# Patient Record
Sex: FEMALE | Race: WHITE | Employment: PART TIME | URBAN - METROPOLITAN AREA
[De-identification: names, ages, dates, MRNs, and addresses within clinical notes are randomized per-mention and may not be internally consistent; named-entity substitution may affect disease eponyms.]

---

## 2021-06-11 ENCOUNTER — TELEPHONE (OUTPATIENT)
Dept: PRIMARY CARE CLINIC | Age: 61
End: 2021-06-11

## 2021-06-11 NOTE — TELEPHONE ENCOUNTER
Patient called in requesting to establish, previous physician was Chandni Damico from [place]. Please advise.

## 2021-06-23 ENCOUNTER — OFFICE VISIT (OUTPATIENT)
Dept: PRIMARY CARE CLINIC | Age: 61
End: 2021-06-23
Payer: COMMERCIAL

## 2021-06-23 VITALS
HEIGHT: 65 IN | OXYGEN SATURATION: 98 % | BODY MASS INDEX: 35.99 KG/M2 | HEART RATE: 89 BPM | WEIGHT: 216 LBS | TEMPERATURE: 97.1 F | DIASTOLIC BLOOD PRESSURE: 86 MMHG | SYSTOLIC BLOOD PRESSURE: 144 MMHG

## 2021-06-23 DIAGNOSIS — E11.9 TYPE 2 DIABETES MELLITUS WITHOUT COMPLICATION, WITHOUT LONG-TERM CURRENT USE OF INSULIN (HCC): ICD-10-CM

## 2021-06-23 DIAGNOSIS — I10 ESSENTIAL (PRIMARY) HYPERTENSION: ICD-10-CM

## 2021-06-23 DIAGNOSIS — E03.9 HYPOTHYROIDISM, UNSPECIFIED TYPE: ICD-10-CM

## 2021-06-23 DIAGNOSIS — M25.512 LEFT SHOULDER PAIN, UNSPECIFIED CHRONICITY: Primary | ICD-10-CM

## 2021-06-23 DIAGNOSIS — N20.0 CALCULUS OF KIDNEY: ICD-10-CM

## 2021-06-23 DIAGNOSIS — G25.0 ESSENTIAL TREMOR: ICD-10-CM

## 2021-06-23 DIAGNOSIS — I89.0 LYMPHEDEMA: ICD-10-CM

## 2021-06-23 DIAGNOSIS — Z12.11 SCREEN FOR COLON CANCER: ICD-10-CM

## 2021-06-23 DIAGNOSIS — Z12.31 SCREENING MAMMOGRAM, ENCOUNTER FOR: ICD-10-CM

## 2021-06-23 PROCEDURE — 99204 OFFICE O/P NEW MOD 45 MIN: CPT | Performed by: FAMILY MEDICINE

## 2021-06-23 RX ORDER — FUROSEMIDE 20 MG/1
20 TABLET ORAL AS NEEDED
COMMUNITY

## 2021-06-23 RX ORDER — CHOLECALCIFEROL (VITAMIN D3) 1250 MCG
CAPSULE ORAL
COMMUNITY
End: 2022-02-24 | Stop reason: DRUGHIGH

## 2021-06-23 RX ORDER — LEVOTHYROXINE SODIUM 25 MCG
25 TABLET ORAL DAILY
COMMUNITY
Start: 2021-05-07

## 2021-06-23 RX ORDER — DAPAGLIFLOZIN 10 MG/1
10 TABLET, FILM COATED ORAL DAILY
COMMUNITY
Start: 2021-06-12

## 2021-06-23 RX ORDER — METOPROLOL TARTRATE 50 MG/1
TABLET, FILM COATED ORAL
COMMUNITY
Start: 2021-04-29 | End: 2021-08-20 | Stop reason: SDUPTHER

## 2021-06-23 SDOH — ECONOMIC STABILITY: FOOD INSECURITY: WITHIN THE PAST 12 MONTHS, THE FOOD YOU BOUGHT JUST DIDN'T LAST AND YOU DIDN'T HAVE MONEY TO GET MORE.: NEVER TRUE

## 2021-06-23 SDOH — ECONOMIC STABILITY: FOOD INSECURITY: WITHIN THE PAST 12 MONTHS, YOU WORRIED THAT YOUR FOOD WOULD RUN OUT BEFORE YOU GOT MONEY TO BUY MORE.: NEVER TRUE

## 2021-06-23 ASSESSMENT — PATIENT HEALTH QUESTIONNAIRE - PHQ9
SUM OF ALL RESPONSES TO PHQ QUESTIONS 1-9: 0
2. FEELING DOWN, DEPRESSED OR HOPELESS: 0
SUM OF ALL RESPONSES TO PHQ QUESTIONS 1-9: 0
SUM OF ALL RESPONSES TO PHQ QUESTIONS 1-9: 0
SUM OF ALL RESPONSES TO PHQ9 QUESTIONS 1 & 2: 0
1. LITTLE INTEREST OR PLEASURE IN DOING THINGS: 0

## 2021-06-23 ASSESSMENT — ENCOUNTER SYMPTOMS
SHORTNESS OF BREATH: 0
NAUSEA: 0
WHEEZING: 0
VOMITING: 0

## 2021-06-23 ASSESSMENT — SOCIAL DETERMINANTS OF HEALTH (SDOH): HOW HARD IS IT FOR YOU TO PAY FOR THE VERY BASICS LIKE FOOD, HOUSING, MEDICAL CARE, AND HEATING?: NOT HARD AT ALL

## 2021-06-23 NOTE — PROGRESS NOTES
[facility]    Georgi Sandhu presents to the office today for   Chief Complaint   Patient presents with   Arnoldo Huston Doctor    Shoulder Pain     Type 2 diabetes  Seeing Dr. Zacarias Elizondo tomorrow  Partial parathyroidectomy in late [de-identified]    Nephrolithiasis  Seeing Dr. Skinny Gilliland in 2019  Following for now    Lymphedema  Lasix PRN with potassium occasionally    GYN - none currently  No pap in 4-5 years  No period since mid-43s    39year old daughter with Down Syndrome    Left shoulder pain  ROM limited overhead and behind back  Hand Tremor - dad has head tremor - grandfather had significant tremoring  No known Parkinsons in family  No xrays or evaluation in past    Review of Systems   Constitutional: Negative for chills and fever. Respiratory: Negative for shortness of breath and wheezing. Cardiovascular: Negative for chest pain and palpitations. Gastrointestinal: Negative for nausea and vomiting. Genitourinary: Negative for dysuria, hematuria and urgency. Skin: Negative for rash. Neurological: Negative for dizziness and light-headedness. BP (!) 144/86   Pulse 89   Temp 97.1 °F (36.2 °C) (Temporal)   Ht 5' 5\" (1.651 m)   Wt 216 lb (98 kg)   SpO2 98%   BMI 35.94 kg/m²   Physical Exam  Constitutional:       Appearance: Normal appearance. HENT:      Head: Normocephalic and atraumatic. Eyes:      Extraocular Movements: Extraocular movements intact. Conjunctiva/sclera: Conjunctivae normal.   Cardiovascular:      Rate and Rhythm: Normal rate. Heart sounds: Normal heart sounds. Pulmonary:      Effort: Pulmonary effort is normal.      Breath sounds: Normal breath sounds. Musculoskeletal:      Left shoulder: No bony tenderness. Decreased range of motion. Normal strength. Comments: Empty can test positive   Skin:     General: Skin is warm.    Neurological:      Mental Status: She is alert and oriented to person, place, and time. Psychiatric:         Mood and Affect: Mood normal.         Behavior: Behavior normal.            Current Outpatient Medications:     FARXIGA 10 MG tablet, , Disp: , Rfl:     SYNTHROID 25 MCG tablet, , Disp: , Rfl:     metoprolol tartrate (LOPRESSOR) 50 MG tablet, , Disp: , Rfl:     Cholecalciferol (VITAMIN D3) 1.25 MG (84345 UT) CAPS, Take by mouth, Disp: , Rfl:     furosemide (LASIX) 20 MG tablet, Take 20 mg by mouth 2 times daily, Disp: , Rfl:     metFORMIN (GLUCOPHAGE) 500 MG tablet, Take 500 mg by mouth daily (with breakfast), Disp: , Rfl:     metFORMIN (GLUCOPHAGE) 500 MG tablet, Take 500 mg by mouth every evening, Disp: , Rfl:     POTASSIUM CHLORIDE PO, Take by mouth, Disp: , Rfl:      No past medical history on file. Chang Starkey was seen today for established new doctor and shoulder pain. Diagnoses and all orders for this visit:    Left shoulder pain, unspecified chronicity    Screening mammogram, encounter for  -     [facility] FREDI DIGITAL SCREEN BILATERAL PER PROTOCOL; Future    Screen for colon cancer  -     Cologuard (For External Results Only);  Future    Calculus of kidney    Essential (primary) hypertension    Hypothyroidism, unspecified type    Type 2 diabetes mellitus without complication, without long-term current use of insulin (HCC)    Lymphedema    Essential tremor       Shoulder likely RTC strain  Home exercises  NSAIDs  Update me in 6-8 weeks for possible ortho referral  Mammogram at [address] [place] [address] Drive  Please have Dr. Radha Cooper office fax me lab results  Pap in 6 weeks if not done by new GYN  Otherwise see me in 6 months    Mandy Bui MD

## 2021-06-23 NOTE — PATIENT INSTRUCTIONS
Patient Education        Shoulder Stretches: Exercises  Introduction  Here are some examples of exercises for you to try. The exercises may be suggested for a condition or for rehabilitation. Start each exercise slowly. Ease off the exercises if you start to have pain. You will be told when to start these exercises and which ones will work best for you. How to do the exercises  Shoulder stretch   1.  a doorway and place one arm against the door frame. Your elbow should be a little higher than your shoulder. 2. Relax your shoulders as you lean forward, allowing your chest and shoulder muscles to stretch. You can also turn your body slightly away from your arm to stretch the muscles even more. 3. Hold for 15 to 30 seconds. 4. Repeat 2 to 4 times with each arm. Shoulder and chest stretch   1. Shoulder and chest stretch  2. While sitting, relax your upper body so you slump slightly in your chair. 3. As you breathe in, straighten your back and open your arms out to the sides. 4. Gently pull your shoulder blades back and downward. 5. Hold for 15 to 30 seconds as your breathe normally. 6. Repeat 2 to 4 times. Overhead stretch   1. Reach up over your head with both arms. 2. Hold for 15 to 30 seconds. 3. Repeat 2 to 4 times. Follow-up care is a key part of your treatment and safety. Be sure to make and go to all appointments, and call your doctor if you are having problems. It's also a good idea to know your test results and keep a list of the medicines you take. Where can you learn more? Go to [email or web address] and sign in to your [employer] account. Enter S254 in the EZ4U box to learn more about \"Shoulder Stretches: Exercises. \"     If you do not have an account, please click on the \"Sign Up Now\" link. Current as of: November 16, 2020               Content Version: 12.9  © 1734-0428 Healthwise, Incorporated.    Care instructions adapted under license by

## 2021-07-07 DIAGNOSIS — Z12.11 SCREEN FOR COLON CANCER: ICD-10-CM

## 2021-07-28 ENCOUNTER — HOSPITAL ENCOUNTER (OUTPATIENT)
Dept: MAMMOGRAPHY | Age: 61
Discharge: HOME | End: 2021-07-30
Payer: COMMERCIAL

## 2021-07-28 DIAGNOSIS — Z12.31 SCREENING MAMMOGRAM, ENCOUNTER FOR: ICD-10-CM

## 2021-08-20 RX ORDER — METOPROLOL TARTRATE 50 MG/1
TABLET, FILM COATED ORAL
Qty: 135 TABLET | Refills: 1 | Status: SHIPPED
Start: 2021-08-20 | End: 2022-03-03 | Stop reason: SDUPTHER

## 2021-09-15 ENCOUNTER — OFFICE VISIT (OUTPATIENT)
Dept: PRIMARY CARE CLINIC | Age: 61
End: 2021-09-15
Payer: COMMERCIAL

## 2021-09-15 VITALS
BODY MASS INDEX: 36.65 KG/M2 | SYSTOLIC BLOOD PRESSURE: 152 MMHG | DIASTOLIC BLOOD PRESSURE: 84 MMHG | HEIGHT: 65 IN | OXYGEN SATURATION: 98 % | HEART RATE: 71 BPM | TEMPERATURE: 96.8 F | WEIGHT: 220 LBS

## 2021-09-15 DIAGNOSIS — R21 RASH: Primary | ICD-10-CM

## 2021-09-15 PROCEDURE — 99213 OFFICE O/P EST LOW 20 MIN: CPT | Performed by: FAMILY MEDICINE

## 2021-09-15 RX ORDER — NICOTINE POLACRILEX 2 MG
1 GUM BUCCAL DAILY
COMMUNITY

## 2021-09-15 RX ORDER — CLOTRIMAZOLE AND BETAMETHASONE DIPROPIONATE 10; .64 MG/G; MG/G
CREAM TOPICAL
Qty: 45 G | Refills: 0 | Status: SHIPPED | OUTPATIENT
Start: 2021-09-15

## 2021-09-15 RX ORDER — GLIMEPIRIDE 2 MG/1
2 TABLET ORAL 2 TIMES DAILY
COMMUNITY
End: 2022-02-24

## 2021-09-15 NOTE — PROGRESS NOTES
[facility]    Jolie Spine presents to the office today for   Chief Complaint   Patient presents with    Rash     back of leg     Rash on back of leg  Every summer  No itch  No pain  Resolves on its own  Wondering what it is    Review of Systems     BP (!) 152/84   Pulse 71   Temp 96.8 °F (36 °C) (Temporal)   Ht 5' 5\" (1.651 m)   Wt 220 lb (99.8 kg)   SpO2 98%   BMI 36.61 kg/m²   Physical Exam  Constitutional:       Appearance: Normal appearance. HENT:      Head: Normocephalic and atraumatic. Eyes:      Extraocular Movements: Extraocular movements intact. Conjunctiva/sclera: Conjunctivae normal.   Cardiovascular:      Rate and Rhythm: Normal rate. Pulmonary:      Effort: Pulmonary effort is normal.   Skin:     General: Skin is warm. Comments: Round lesions on back of leg with raised border   Neurological:      Mental Status: She is alert and oriented to person, place, and time. Psychiatric:         Mood and Affect: Mood normal.         Behavior: Behavior normal.            Current Outpatient Medications:     glimepiride (AMARYL) 2 MG tablet, Take 2 mg by mouth 2 times daily, Disp: , Rfl:     Biotin 1 MG CAPS, Take by mouth, Disp: , Rfl:     clotrimazole-betamethasone (LOTRISONE) 1-0.05 % cream, Apply topically 2 times daily. , Disp: 45 g, Rfl: 0    metoprolol tartrate (LOPRESSOR) 50 MG tablet, Take I tablet q am and 1/2 tab q pm, Disp: 135 tablet, Rfl: 1    FARXIGA 10 MG tablet, , Disp: , Rfl:     SYNTHROID 25 MCG tablet, , Disp: , Rfl:     Cholecalciferol (VITAMIN D3) 1.25 MG (14485 UT) CAPS, Take by mouth, Disp: , Rfl:     furosemide (LASIX) 20 MG tablet, Take 20 mg by mouth 2 times daily, Disp: , Rfl:     metFORMIN (GLUCOPHAGE) 500 MG tablet, Take 500 mg by mouth daily (with breakfast), Disp: , Rfl:     metFORMIN (GLUCOPHAGE) 500 MG tablet, Take 500 mg by mouth every evening, Disp: , Rfl:     POTASSIUM CHLORIDE PO, Take by mouth, Disp: , Rfl:      No past medical history on file. Matthew Damian was seen today for rash. Diagnoses and all orders for this visit:    Rash  -     External Referral To Dermatology  -     clotrimazole-betamethasone (LOTRISONE) 1-0.05 % cream; Apply topically 2 times daily.          Chela Astorga MD

## 2021-10-07 LAB
AVERAGE GLUCOSE: NORMAL
HBA1C MFR BLD: 10.2 %

## 2021-10-09 LAB
ALBUMIN SERPL-MCNC: 3.8 G/DL
ALP BLD-CCNC: 75 U/L
ALT SERPL-CCNC: 14 U/L
ANION GAP SERPL CALCULATED.3IONS-SCNC: 1.5 MMOL/L
AST SERPL-CCNC: 14 U/L
BASOPHILS ABSOLUTE: 60 /ΜL
BASOPHILS RELATIVE PERCENT: 1 %
BILIRUB SERPL-MCNC: 0.4 MG/DL (ref 0.1–1.4)
BUN BLDV-MCNC: 12 MG/DL
CALCIUM SERPL-MCNC: 8.9 MG/DL
CHLORIDE BLD-SCNC: 103 MMOL/L
CHOLESTEROL, TOTAL: 196 MG/DL
CHOLESTEROL/HDL RATIO: 2.6
CO2: 28 MMOL/L
CREAT SERPL-MCNC: 0.68 MG/DL
CREATININE, URINE: 58
EOSINOPHILS ABSOLUTE: 162 /ΜL
EOSINOPHILS RELATIVE PERCENT: 2.7 %
GFR CALCULATED: 95
GLUCOSE BLD-MCNC: 213 MG/DL
HCT VFR BLD CALC: 43.8 % (ref 36–46)
HDLC SERPL-MCNC: 76 MG/DL (ref 35–70)
HEMOGLOBIN: 14.6 G/DL (ref 12–16)
LDL CHOLESTEROL CALCULATED: 100 MG/DL (ref 0–160)
LYMPHOCYTES ABSOLUTE: 2100 /ΜL
LYMPHOCYTES RELATIVE PERCENT: 35 %
MCH RBC QN AUTO: 28.9 PG
MCHC RBC AUTO-ENTMCNC: 33.3 G/DL
MCV RBC AUTO: 86.6 FL
MICROALBUMIN/CREAT 24H UR: 0.2 MG/G{CREAT}
MICROALBUMIN/CREAT UR-RTO: 3
MONOCYTES ABSOLUTE: 696 /ΜL
MONOCYTES RELATIVE PERCENT: 11.6 %
NEUTROPHILS ABSOLUTE: 2982 /ΜL
NEUTROPHILS RELATIVE PERCENT: 49.7 %
NONHDLC SERPL-MCNC: 120 MG/DL
PDW BLD-RTO: 13.7 %
PLATELET # BLD: 210 K/ΜL
PMV BLD AUTO: 11.5 FL
POTASSIUM SERPL-SCNC: 4.8 MMOL/L
RBC # BLD: 5.06 10^6/ΜL
SODIUM BLD-SCNC: 138 MMOL/L
T4 FREE: 1.2
TOTAL PROTEIN: 6.3
TRIGL SERPL-MCNC: 100 MG/DL
TSH SERPL DL<=0.05 MIU/L-ACNC: 2.31 UIU/ML
VLDLC SERPL CALC-MCNC: ABNORMAL MG/DL
WBC # BLD: 6 10^3/ML

## 2021-10-18 ENCOUNTER — HOSPITAL ENCOUNTER (OUTPATIENT)
Dept: MAMMOGRAPHY | Age: 61
Discharge: HOME | End: 2021-10-20
Payer: COMMERCIAL

## 2021-10-18 PROCEDURE — 77063 BREAST TOMOSYNTHESIS BI: CPT

## 2021-12-28 ENCOUNTER — OFFICE VISIT (OUTPATIENT)
Dept: PRIMARY CARE CLINIC | Age: 61
End: 2021-12-28
Payer: COMMERCIAL

## 2021-12-28 VITALS
WEIGHT: 221 LBS | BODY MASS INDEX: 36.82 KG/M2 | HEART RATE: 70 BPM | OXYGEN SATURATION: 99 % | DIASTOLIC BLOOD PRESSURE: 80 MMHG | SYSTOLIC BLOOD PRESSURE: 142 MMHG | TEMPERATURE: 98.7 F | HEIGHT: 65 IN

## 2021-12-28 DIAGNOSIS — I10 ESSENTIAL (PRIMARY) HYPERTENSION: ICD-10-CM

## 2021-12-28 DIAGNOSIS — M25.512 CHRONIC LEFT SHOULDER PAIN: Primary | ICD-10-CM

## 2021-12-28 DIAGNOSIS — E03.9 HYPOTHYROIDISM, UNSPECIFIED TYPE: ICD-10-CM

## 2021-12-28 DIAGNOSIS — G89.29 CHRONIC LEFT SHOULDER PAIN: Primary | ICD-10-CM

## 2021-12-28 DIAGNOSIS — I89.0 LYMPHEDEMA: ICD-10-CM

## 2021-12-28 DIAGNOSIS — N20.0 CALCULUS OF KIDNEY: ICD-10-CM

## 2021-12-28 DIAGNOSIS — E11.9 TYPE 2 DIABETES MELLITUS WITHOUT COMPLICATION, WITHOUT LONG-TERM CURRENT USE OF INSULIN (HCC): ICD-10-CM

## 2021-12-28 PROCEDURE — 99214 OFFICE O/P EST MOD 30 MIN: CPT | Performed by: FAMILY MEDICINE

## 2021-12-28 NOTE — PROGRESS NOTES
[facility]    Scottie Lam presents to the office today for   Chief Complaint   Patient presents with    6 Month Follow-Up     Type 2 diabetes  Seeing Dr. Cielo Torres office  Last visit 10/2021     Hypothyroid  Seeing Dr. Cielo Torres regularly  Partial parathyroidectomy in late [de-identified]     Nephrolithiasis  Seeing Dr. Gonsalo Mccloud - last visit 3-4 months ago  Lithotripsy in 2019  Following for now     Lymphedema  Lasix PRN with potassium occasionally     GYN - none currently  She seeks to do here in future  Last pap 4-5 years ago     39year old daughter with Down Syndrome     Left shoulder pain  ROM limited overhead and behind back  She did 6 weeks of PT without improvement    Review of Systems   Constitutional: Negative for chills and fever. Genitourinary: Negative for dysuria, hematuria and urgency. Skin: Negative for rash. Neurological: Negative for dizziness and light-headedness. BP (!) 142/80   Pulse 70   Temp 98.7 °F (37.1 °C) (Oral)   Ht 5' 5\" (1.651 m)   Wt 221 lb (100.2 kg)   SpO2 99%   BMI 36.78 kg/m²   Physical Exam  Constitutional:       Appearance: Normal appearance. HENT:      Head: Normocephalic and atraumatic. Eyes:      Extraocular Movements: Extraocular movements intact. Conjunctiva/sclera: Conjunctivae normal.   Cardiovascular:      Rate and Rhythm: Normal rate. Heart sounds: Normal heart sounds. Pulmonary:      Effort: Pulmonary effort is normal.      Breath sounds: Normal breath sounds. Skin:     General: Skin is warm. Neurological:      Mental Status: She is alert and oriented to person, place, and time. Psychiatric:         Mood and Affect: Mood normal.         Behavior: Behavior normal.            Current Outpatient Medications:     glimepiride (AMARYL) 2 MG tablet, Take 2 mg by mouth 2 times daily, Disp: , Rfl:     Biotin 1 MG CAPS, Take by mouth, Disp: , Rfl:     clotrimazole-betamethasone (LOTRISONE) 1-0.05 % cream, Apply topically 2 times daily. , Disp: 45 g, Rfl: 0    metoprolol tartrate (LOPRESSOR) 50 MG tablet, Take I tablet q am and 1/2 tab q pm, Disp: 135 tablet, Rfl: 1    FARXIGA 10 MG tablet, , Disp: , Rfl:     SYNTHROID 25 MCG tablet, , Disp: , Rfl:     Cholecalciferol (VITAMIN D3) 1.25 MG (77216 UT) CAPS, Take by mouth, Disp: , Rfl:     furosemide (LASIX) 20 MG tablet, Take 20 mg by mouth 2 times daily, Disp: , Rfl:     POTASSIUM CHLORIDE PO, Take by mouth, Disp: , Rfl:      No past medical history on file. Ciarra Mckenna was seen today for 6 month follow-up. Diagnoses and all orders for this visit:    Chronic left shoulder pain  -     XR SHOULDER LEFT (MIN 2 VIEWS);  Future    Essential (primary) hypertension    Hypothyroidism, unspecified type    Type 2 diabetes mellitus without complication, without long-term current use of insulin (HCC)    Calculus of kidney    Lymphedema       Xray shoulder  Likely ortho referral [place]  Request labs from Endo  Recheck 6 months Dr. Damari Kwon MD

## 2022-01-04 DIAGNOSIS — G89.29 CHRONIC LEFT SHOULDER PAIN: Primary | ICD-10-CM

## 2022-01-04 DIAGNOSIS — M25.512 CHRONIC LEFT SHOULDER PAIN: Primary | ICD-10-CM

## 2022-01-24 ENCOUNTER — TELEPHONE (OUTPATIENT)
Dept: FAMILY MEDICINE CLINIC | Age: 62
End: 2022-01-24

## 2022-01-24 NOTE — TELEPHONE ENCOUNTER
Ann Marie calling in her bp has been high. Average reading in A.M is 150s/80s. Evening bps are 130s/80s. She is currently on metoprolol lopressor 50mg 1 tab in morning and a 1/2 tab at night. Do you want her to increase to a whole tab at night til she sees new pcp?

## 2022-02-11 LAB — DIABETIC RETINOPATHY: NORMAL

## 2022-02-24 ENCOUNTER — OFFICE VISIT (OUTPATIENT)
Dept: PRIMARY CARE CLINIC | Age: 62
End: 2022-02-24
Payer: COMMERCIAL

## 2022-02-24 VITALS
OXYGEN SATURATION: 98 % | TEMPERATURE: 97.5 F | HEART RATE: 68 BPM | DIASTOLIC BLOOD PRESSURE: 80 MMHG | WEIGHT: 223.8 LBS | HEIGHT: 65 IN | BODY MASS INDEX: 37.29 KG/M2 | SYSTOLIC BLOOD PRESSURE: 120 MMHG

## 2022-02-24 DIAGNOSIS — N18.1 CKD (CHRONIC KIDNEY DISEASE) STAGE 1, GFR 90 ML/MIN OR GREATER: ICD-10-CM

## 2022-02-24 DIAGNOSIS — E11.22 TYPE 2 DIABETES MELLITUS WITH STAGE 1 CHRONIC KIDNEY DISEASE, WITH LONG-TERM CURRENT USE OF INSULIN (HCC): ICD-10-CM

## 2022-02-24 DIAGNOSIS — Z76.89 ENCOUNTER TO ESTABLISH CARE WITH NEW DOCTOR: Primary | ICD-10-CM

## 2022-02-24 DIAGNOSIS — Z79.4 TYPE 2 DIABETES MELLITUS WITH STAGE 1 CHRONIC KIDNEY DISEASE, WITH LONG-TERM CURRENT USE OF INSULIN (HCC): ICD-10-CM

## 2022-02-24 DIAGNOSIS — I10 ESSENTIAL (PRIMARY) HYPERTENSION: ICD-10-CM

## 2022-02-24 DIAGNOSIS — N18.1 TYPE 2 DIABETES MELLITUS WITH STAGE 1 CHRONIC KIDNEY DISEASE, WITH LONG-TERM CURRENT USE OF INSULIN (HCC): ICD-10-CM

## 2022-02-24 PROBLEM — M75.02 ADHESIVE CAPSULITIS OF LEFT SHOULDER: Status: ACTIVE | Noted: 2022-02-24

## 2022-02-24 PROBLEM — M19.90 ARTHRITIS: Status: ACTIVE | Noted: 2022-01-13

## 2022-02-24 PROBLEM — N20.0 CALCULUS OF KIDNEY: Status: RESOLVED | Noted: 2021-02-24 | Resolved: 2022-02-24

## 2022-02-24 PROBLEM — M75.42 IMPINGEMENT SYNDROME OF LEFT SHOULDER: Status: ACTIVE | Noted: 2022-02-24

## 2022-02-24 PROBLEM — R73.9 HYPERGLYCEMIA: Status: ACTIVE | Noted: 2022-01-13

## 2022-02-24 PROBLEM — E55.9 VITAMIN D DEFICIENCY: Status: ACTIVE | Noted: 2022-02-24

## 2022-02-24 PROBLEM — N20.0 CALCULUS OF KIDNEY: Status: ACTIVE | Noted: 2021-02-24

## 2022-02-24 PROBLEM — R73.9 HYPERGLYCEMIA: Status: RESOLVED | Noted: 2022-01-13 | Resolved: 2022-02-24

## 2022-02-24 PROCEDURE — 99214 OFFICE O/P EST MOD 30 MIN: CPT | Performed by: STUDENT IN AN ORGANIZED HEALTH CARE EDUCATION/TRAINING PROGRAM

## 2022-02-24 RX ORDER — CHOLECALCIFEROL (VITAMIN D3) 25 MCG
2500 TABLET,CHEWABLE ORAL DAILY
COMMUNITY

## 2022-02-24 RX ORDER — IBUPROFEN 600 MG/1
TABLET ORAL
COMMUNITY

## 2022-02-24 RX ORDER — AMOXICILLIN 500 MG/1
CAPSULE ORAL
COMMUNITY
Start: 2022-01-25 | End: 2022-02-24 | Stop reason: ALTCHOICE

## 2022-02-24 RX ORDER — BLOOD PRESSURE TEST KIT
KIT MISCELLANEOUS
Qty: 1 KIT | Refills: 0 | Status: SHIPPED | OUTPATIENT
Start: 2022-02-24

## 2022-02-24 RX ORDER — METFORMIN HYDROCHLORIDE 500 MG/1
TABLET, EXTENDED RELEASE ORAL
COMMUNITY
Start: 2022-02-08 | End: 2022-02-24

## 2022-02-24 RX ORDER — GLIPIZIDE 10 MG/1
TABLET, FILM COATED, EXTENDED RELEASE ORAL
COMMUNITY
End: 2022-02-24

## 2022-02-24 RX ORDER — ORAL SEMAGLUTIDE 7 MG/1
TABLET ORAL
COMMUNITY
End: 2022-02-24

## 2022-02-24 ASSESSMENT — ENCOUNTER SYMPTOMS: SHORTNESS OF BREATH: 0

## 2022-02-24 NOTE — PROGRESS NOTES
NEW PRIMARY CARE VISIT    23  Name: Suzan Dwyer   : 1960   Age: 62 y.o. Sex: female        Assessment & Plan:     Problem List Items Addressed This Visit        Circulatory    Essential hypertension     Controlled in office  Per patient uncontrolled in mornings at home  Provided new cuff order  Patient declines to make changes today, continue metoprolol 50mg BID  If remains elevated at home, consider nightly ACE-I or ARB given diabetes  Recent annual labs normal            Endocrine    Type 2 diabetes mellitus with stage 1 chronic kidney disease, with long-term current use of insulin ([address]     Uncontrolled  Follow[facility] with end[facility] Son[facility]         Relevant Medications    Insulin Degludec (TRESIBA SC)       Genitourinary    CKD (chronic kidney disease) stage 1, GFR 90 ml/min or greater     Monitor with annual labs           Other Visit Diagnoses     Encounter to establish care with new doctor    -  Primary    History reviewed and updated          Counseled patient regarding above diagnosis, including possible risks and complications, especially if left uncontrolled. Counseled patient as appropriate and relevant regarding any possible side effects, risks, and alternatives to treatment; the patient verbalizes understanding, and is in agreement with the plan as detailed above. All educational materials and instructions were discussed and included on the After Visit Summary. All questions answered to the patient's satisfaction. The patient was advised to call for any concerns prior to next appointment. Return in about 4 weeks (around 3/24/2022) for hypertension. 30 minutes was spent precharting, face-to-face with patient, and documenting on day of encounter. This provider and patient were wearing surgical masks and practiced social distancing when appropriate during visit due to COVID-19 pandemic.     Subjective:     Chief Complaint   Patient presents with    New Patient     blood pressure stays on the higher side        Patient needs new PCP. Last PCP changed practice locations. Blood pressure up to 167/97 at home. Sometimes well controlled at home 130s/80s. Usually when elevated is in the morning. Takes metoprolol 50mg BID 10am and 10pm. Heart rate 60-90. Review of Systems   Eyes: Negative for visual disturbance. Respiratory: Negative for shortness of breath. Cardiovascular: Positive for leg swelling (intermittent, worse in summer or if on feet). Negative for chest pain and palpitations. Neurological: Negative for light-headedness and headaches. Psychiatric/Behavioral: Negative for dysphoric mood. The patient is not nervous/anxious. Medical History:   History reviewed and updated as needed.     Patient Active Problem List   Diagnosis    Essential hypertension    Type 2 diabetes mellitus without complication, without long-term current use of insulin ([facility])    Hypothyroidism    Lymphedema    Essential tremor    Arthritis    Adhesive capsulitis of left shoulder    Impingement syndrome of left shoulder    Vitamin D deficiency    Calculus of kidney        Past Medical History:   Diagnosis Date    Calculus of kidney 02/24/2021       Past Surgical History:   Procedure Laterality Date    APPENDECTOMY      CHOLECYSTECTOMY      LITHOTRIPSY      PARATHYROIDECTOMY      TENDON MANIPULATION      left frozen shoulder       Family History   Problem Relation Age of Onset    Breast Cancer Mother 48        twice    Lung Cancer Mother     Cancer Mother         bladder   [facility] Diabetes Father     Hypertension Father     Other Sister         spina bifida    Hypertension Brother     Other Brother         diverticulitis    Kidney stones Daughter     Down Syndrome Daughter     Kidney stones Daughter     Thyroid Disease Daughter     Glaucoma Daughter         pre-glaucoma    Cataracts Daughter     Hypertension Brother    [facility] Diabetes Brother        Medications:     Current Outpatient Medications:     ibuprofen (ADVIL;MOTRIN) 600 MG tablet, ibuprofen 600 mg tablet  TAKE 1 TABLET BY MOUTH EVERY 8 HOURS AS NEEDED FOR PAIN, Disp: , Rfl:     Insulin Degludec (TRESIBA SC), Inject 24 Units into the skin every morning, Disp: , Rfl:     vitamin D (CHOLECALCIFEROL) 25 MCG (1000 UT) TABS tablet, Take 2,000 Units by mouth daily, Disp: , Rfl:     Cyanocobalamin (B-12) 2500 MCG TABS, Take 2,500 mcg by mouth daily, Disp: , Rfl:     Biotin 1 MG CAPS, Take by mouth, Disp: , Rfl:     metoprolol tartrate (LOPRESSOR) 50 MG tablet, Take I tablet q am and 1/2 tab q pm, Disp: 135 tablet, Rfl: 1    FARXIGA 10 MG tablet, , Disp: , Rfl:     SYNTHROID 25 MCG tablet, , Disp: , Rfl:     furosemide (LASIX) 20 MG tablet, Take 20 mg by mouth as needed, Disp: , Rfl:     POTASSIUM CHLORIDE PO, Take by mouth as needed with furosemide, Disp: , Rfl:     clotrimazole-betamethasone (LOTRISONE) 1-0.05 % cream, Apply topically 2 times daily. , Disp: 45 g, Rfl: 0    Allergies:   No Known Allergies    Social History:     Social History     Socioeconomic History    Marital status:      Spouse name: Not on file    Number of children: Not on file    Years of education: Not on file    Highest education level: Not on file   Occupational History    Not on file   Tobacco Use    Smoking status: Never Smoker    Smokeless tobacco: Never Used   Substance and Sexual Activity    Alcohol use: Never    Drug use: Never    Sexual activity: Yes     Partners: Male     Comment:    Other Topics Concern    Not on file   Social History Narrative    Not on file     Social Determinants of Health     Financial Resource Strain: Low Risk     Difficulty of Paying Living Expenses: Not hard at all   Food Insecurity: No Food Insecurity    Worried About Running Out of Food in the Last Year: Never true    Hemant of Food in the Last Year: Never true   Transportation Needs:  Lack of Transportation (Medical): Not on file    Lack of Transportation (Non-Medical): Not on file   Physical Activity:     Days of Exercise per Week: Not on file    Minutes of Exercise per Session: Not on file   Stress:     Feeling of Stress : Not on file   Social Connections:     Frequency of Communication with Friends and Family: Not on file    Frequency of Social Gatherings with Friends and Family: Not on file    Attends [employer] Services: Not on file    Active Member of [address] [place] [address] or Organizations: Not on file    Attends Club or Organization Meetings: Not on file    Marital Status: Not on file   Intimate Partner Violence:     Fear of Current or Ex-Partner: Not on file    Emotionally Abused: Not on file    Physically Abused: Not on file    Sexually Abused: Not on file   Housing Stability:     Unable to Pay for Housing in the Last Year: Not on file    Number of [place] in the Last Year: Not on file    Unstable Housing in the Last Year: Not on file       Physical Exam:     Vitals:    02/24/22 1332   BP: 120/80   Pulse: 68   Temp: 97.5 °F (36.4 °C)   SpO2: 98%   Weight: 223 lb 12.8 oz (101.5 kg)   Height: 5' 5\" (1.651 m)       BP Readings from Last 3 Encounters:   02/24/22 120/80   12/28/21 (!) 142/80   09/15/21 (!) 152/84       Wt Readings from Last 3 Encounters:   02/24/22 223 lb 12.8 oz (101.5 kg)   12/28/21 221 lb (100.2 kg)   09/15/21 220 lb (99.8 kg)        Physical Exam  Vitals and nursing note reviewed. Constitutional:       General: She is not in acute distress. Appearance: Normal appearance. She is obese. She is not ill-appearing or diaphoretic. Cardiovascular:      Rate and Rhythm: Normal rate and regular rhythm. Heart sounds: Normal heart sounds. Pulmonary:      Effort: Pulmonary effort is normal. No respiratory distress. Breath sounds: Normal breath sounds. Musculoskeletal:      Right lower leg: No edema. Left lower leg: No edema.    Skin:     General: Skin is warm and dry. Neurological:      Mental Status: She is alert and oriented to person, place, and time. Psychiatric:         Mood and Affect: Mood normal.         Behavior: Behavior normal.         Testing:   No orders of the defined types were placed in this encounter. No results found for this or any previous visit (from the past 24 hour(s)).

## 2022-02-25 PROBLEM — Z79.4 TYPE 2 DIABETES MELLITUS WITH STAGE 1 CHRONIC KIDNEY DISEASE, WITH LONG-TERM CURRENT USE OF INSULIN (HCC): Status: ACTIVE | Noted: 2021-06-23

## 2022-02-25 PROBLEM — E11.22 TYPE 2 DIABETES MELLITUS WITH STAGE 1 CHRONIC KIDNEY DISEASE, WITH LONG-TERM CURRENT USE OF INSULIN (HCC): Status: ACTIVE | Noted: 2021-06-23

## 2022-02-25 PROBLEM — N18.1 TYPE 2 DIABETES MELLITUS WITH STAGE 1 CHRONIC KIDNEY DISEASE, WITH LONG-TERM CURRENT USE OF INSULIN (HCC): Status: ACTIVE | Noted: 2021-06-23

## 2022-02-26 NOTE — ASSESSMENT & PLAN NOTE
Controlled in office  Per patient uncontrolled in mornings at home  Provided new cuff order  Patient declines to make changes today, continue metoprolol 50mg BID  If remains elevated at home, consider nightly ACE-I or ARB given diabetes  Recent annual labs normal

## 2022-03-02 DIAGNOSIS — I10 ESSENTIAL HYPERTENSION: Primary | ICD-10-CM

## 2022-03-02 NOTE — TELEPHONE ENCOUNTER
Pt is calling to let you know since she got her new BP monitor her bp has Been better, 120/79 today at 9:30 and didn't know if you still wanted to switch BP medications or not. Pt is also out of the metoprolol and didn't know if you could send something to [facility] [place] [facility]? Because she has to use [facility] and that can take 4-6 days. Please advise.

## 2022-03-03 RX ORDER — METOPROLOL TARTRATE 50 MG/1
TABLET, FILM COATED ORAL
Qty: 135 TABLET | Refills: 0 | Status: SHIPPED
Start: 2022-03-03 | End: 2022-03-03 | Stop reason: SDUPTHER

## 2022-03-03 RX ORDER — METOPROLOL TARTRATE 50 MG/1
TABLET, FILM COATED ORAL
Qty: 135 TABLET | Refills: 0 | Status: SHIPPED
Start: 2022-03-03 | End: 2022-03-17 | Stop reason: SDUPTHER

## 2022-03-17 ENCOUNTER — OFFICE VISIT (OUTPATIENT)
Dept: PRIMARY CARE CLINIC | Age: 62
End: 2022-03-17
Payer: COMMERCIAL

## 2022-03-17 VITALS
TEMPERATURE: 97.7 F | OXYGEN SATURATION: 97 % | HEART RATE: 66 BPM | WEIGHT: 226.7 LBS | BODY MASS INDEX: 37.77 KG/M2 | DIASTOLIC BLOOD PRESSURE: 80 MMHG | HEIGHT: 65 IN | SYSTOLIC BLOOD PRESSURE: 130 MMHG

## 2022-03-17 DIAGNOSIS — E11.22 TYPE 2 DIABETES MELLITUS WITH STAGE 1 CHRONIC KIDNEY DISEASE, WITH LONG-TERM CURRENT USE OF INSULIN (HCC): ICD-10-CM

## 2022-03-17 DIAGNOSIS — N18.1 TYPE 2 DIABETES MELLITUS WITH STAGE 1 CHRONIC KIDNEY DISEASE, WITH LONG-TERM CURRENT USE OF INSULIN (HCC): ICD-10-CM

## 2022-03-17 DIAGNOSIS — I10 ESSENTIAL HYPERTENSION: Primary | ICD-10-CM

## 2022-03-17 DIAGNOSIS — Z79.4 TYPE 2 DIABETES MELLITUS WITH STAGE 1 CHRONIC KIDNEY DISEASE, WITH LONG-TERM CURRENT USE OF INSULIN (HCC): ICD-10-CM

## 2022-03-17 PROBLEM — N13.30 HYDRONEPHROSIS: Status: ACTIVE | Noted: 2022-03-17

## 2022-03-17 PROBLEM — N13.30 HYDRONEPHROSIS: Status: RESOLVED | Noted: 2022-03-17 | Resolved: 2022-03-17

## 2022-03-17 PROCEDURE — 99214 OFFICE O/P EST MOD 30 MIN: CPT | Performed by: STUDENT IN AN ORGANIZED HEALTH CARE EDUCATION/TRAINING PROGRAM

## 2022-03-17 RX ORDER — METOPROLOL TARTRATE 50 MG/1
50 TABLET, FILM COATED ORAL 2 TIMES DAILY
Qty: 180 TABLET | Refills: 1 | Status: SHIPPED
Start: 2022-03-17 | End: 2022-09-01 | Stop reason: SDUPTHER

## 2022-03-17 ASSESSMENT — ENCOUNTER SYMPTOMS: SHORTNESS OF BREATH: 0

## 2022-03-17 NOTE — ASSESSMENT & PLAN NOTE
Controlled in office and at home with new cuff  Continue metoprolol 50mg BID  If elevated in future, consider nightly ACE-I or ARB given diabetes  Recent annual labs normal

## 2022-03-17 NOTE — PROGRESS NOTES
ESTABLISHED PRIMARY CARE VISIT    3/17/22  Name: Jaime Ruffin   : 1960   Age: 62 y.o. Sex: female        Assessment & Plan:     Problem List Items Addressed This Visit        Circulatory    Essential hypertension - Primary     Controlled in office and at home with new cuff  Continue metoprolol 50mg BID  If elevated in future, consider nightly ACE-I or ARB given diabetes  Recent annual labs normal         Relevant Medications    metoprolol tartrate (LOPRESSOR) 50 MG tablet       Endocrine    Type 2 diabetes mellitus with stage 1 chronic kidney disease, with long-term current use of insulin (HCC)     Improved with insulin adjustments  Follows with endo Dr. Christal villagranly, [facility]  Foot exam notable for dry skin only  Discussed proper foot precautions         Relevant Orders    HM DIABETES FOOT EXAM (Completed)          Counseled patient regarding above diagnosis, including possible risks and complications, especially if left uncontrolled. Counseled patient as appropriate and relevant regarding any possible side effects, risks, and alternatives to treatment; the patient verbalizes understanding, and is in agreement with the plan as detailed above. All educational materials and instructions were discussed and included on the After Visit Summary. All questions answered to the patient's satisfaction. The patient was advised to call for any concerns prior to next appointment. Return in about 6 months (around 2022) for follow-up blood pressure, labs. This provider and patient were wearing surgical masks and practiced social distancing when appropriate during visit due to COVID-19 pandemic. Subjective:     Chief Complaint   Patient presents with    Hypertension     follow up        Patient returns for follow-up blood pressure  Home blood pressures normal on new monitor      Review of Systems   Eyes: Negative for visual disturbance.    Respiratory: Negative for shortness of breath. Cardiovascular: Positive for leg swelling (intermittent, worse in summer or if on feet). Negative for chest pain and palpitations. Endocrine: Negative for polydipsia and polyuria. Neurological: Negative for weakness, light-headedness, numbness and headaches.        Medical History:     Patient Active Problem List   Diagnosis    Essential hypertension    Type 2 diabetes mellitus with stage 1 chronic kidney disease, with long-term current use of insulin ([facility])    Hypothyroidism    Lymphedema    Essential tremor    Arthritis    Adhesive capsulitis of left shoulder    Impingement syndrome of left shoulder    Vitamin D deficiency    Calculus of kidney    CKD (chronic kidney disease) stage 1, GFR 90 ml/min or greater        Past Medical History:   Diagnosis Date    Calculus of kidney 02/24/2021       Past Surgical History:   Procedure Laterality Date    APPENDECTOMY      CHOLECYSTECTOMY      LITHOTRIPSY      PARATHYROIDECTOMY      TENDON MANIPULATION      left frozen shoulder       Family History   Problem Relation Age of Onset    Breast Cancer Mother 48        twice    Lung Cancer Mother     Cancer Mother         bladder   Mayer Diabetes Father     Hypertension Father    Mayer Other Sister         spina bifida    Hypertension Brother     Other Brother         diverticulitis    Kidney stones Daughter     Down Syndrome Daughter     Kidney stones Daughter     Thyroid Disease Daughter     Glaucoma Daughter         pre-glaucoma    Cataracts Daughter     Hypertension Brother     Diabetes Brother        Medications:     Current Outpatient Medications:     metoprolol tartrate (LOPRESSOR) 50 MG tablet, Take 1 tablet by mouth every morning AND 0.5 tablets every evening., Disp: 135 tablet, Rfl: 0    ibuprofen (ADVIL;MOTRIN) 600 MG tablet, ibuprofen 600 mg tablet  TAKE 1 TABLET BY MOUTH EVERY 8 HOURS AS NEEDED FOR PAIN, Disp: , Rfl:     Insulin Degludec (TRESIBA SC), Inject 24 Units into the skin every morning, Disp: , Rfl:     vitamin D (CHOLECALCIFEROL) 25 MCG (1000 UT) TABS tablet, Take 2,000 Units by mouth daily, Disp: , Rfl:     Cyanocobalamin (B-12) 2500 MCG TABS, Take 2,500 mcg by mouth daily, Disp: , Rfl:     Blood Pressure KIT, Use to check blood pressure 2-3x weekly at home., Disp: 1 kit, Rfl: 0    Biotin 1 MG CAPS, Take by mouth, Disp: , Rfl:     clotrimazole-betamethasone (LOTRISONE) 1-0.05 % cream, Apply topically 2 times daily. , Disp: 45 g, Rfl: 0    FARXIGA 10 MG tablet, , Disp: , Rfl:     SYNTHROID 25 MCG tablet, , Disp: , Rfl:     furosemide (LASIX) 20 MG tablet, Take 20 mg by mouth as needed, Disp: , Rfl:     POTASSIUM CHLORIDE PO, Take by mouth as needed with furosemide, Disp: , Rfl:     Allergies:   No Known Allergies    Social History:     Social History     Socioeconomic History    Marital status:      Spouse name: Not on file    Number of children: Not on file    Years of education: Not on file    Highest education level: Not on file   Occupational History    Not on file   Tobacco Use    Smoking status: Never Smoker    Smokeless tobacco: Never Used   Substance and Sexual Activity    Alcohol use: Never    Drug use: Never    Sexual activity: Yes     Partners: Male     Comment:    Other Topics Concern    Not on file   Social History Narrative    Not on file     Social Determinants of Health     Financial Resource Strain: Low Risk     Difficulty of Paying Living Expenses: Not hard at all   Food Insecurity: No Food Insecurity    Worried About Running Out of Food in the Last Year: Never true    Hemant of Food in the Last Year: Never true   Transportation Needs:     Lack of Transportation (Medical): Not on file    Lack of Transportation (Non-Medical):  Not on file   Physical Activity:     Days of Exercise per Week: Not on file    Minutes of Exercise per Session: Not on file   Stress:     Feeling of Stress : Not on file   Social Connections:     Frequency of Communication with Friends and Family: Not on file    Frequency of Social Gatherings with Friends and Family: Not on file    Attends Bahai Services: Not on file    Active Member of Clubs or Organizations: Not on file    Attends Club or Organization Meetings: Not on file    Marital Status: Not on file   Intimate Partner Violence:     Fear of Current or Ex-Partner: Not on file    Emotionally Abused: Not on file    Physically Abused: Not on file    Sexually Abused: Not on file   Housing Stability:     Unable to Pay for Housing in the Last Year: Not on file    Number of [place] in the Last Year: Not on file    Unstable Housing in the Last Year: Not on file       Physical Exam:     Vitals:    22 1139   BP: 130/80   Site: Left Upper Arm   Position: Sitting   Pulse: 66   Temp: 97.7 °F (36.5 °C)   SpO2: 97%   Weight: 226 lb 11.2 oz (102.8 kg)   Height: 5' 5\" (1.651 m)       BP Readings from Last 3 Encounters:   22 130/80   22 120/80   21 (!) 142/80       Wt Readings from Last 3 Encounters:   22 226 lb 11.2 oz (102.8 kg)   22 223 lb 12.8 oz (101.5 kg)   21 221 lb (100.2 kg)       Physical Exam  Vitals and nursing note reviewed. Constitutional:       General: She is not in acute distress. Appearance: Normal appearance. She is obese. She is not ill-appearing or diaphoretic. Cardiovascular:      Rate and Rhythm: Normal rate and regular rhythm. Pulses:           Dorsalis pedis pulses are 1+ on the right side and 1+ on the left side. Heart sounds: Normal heart sounds. Pulmonary:      Effort: Pulmonary effort is normal. No respiratory distress. Breath sounds: Normal breath sounds. Musculoskeletal:      Right lower le+ Edema present. Left lower le+ Edema present. Feet:      Right foot:      Protective Sensation: 5 sites tested. 5 sites sensed. Skin integrity: Callus and dry skin present.  No ulcer, skin breakdown, erythema, warmth or fissure. Toenail Condition: Right toenails are normal.      Left foot:      Protective Sensation: 5 sites tested. 5 sites sensed. Skin integrity: Callus and dry skin present. No ulcer, skin breakdown, erythema, warmth or fissure. Toenail Condition: Left toenails are normal.   Skin:     General: Skin is warm and dry. Neurological:      Mental Status: She is alert and oriented to person, place, and time. Psychiatric:         Mood and Affect: Mood normal.         Behavior: Behavior normal.         Testing:     Orders Placed This Encounter   Procedures     DIABETES FOOT EXAM       No results found for this or any previous visit (from the past 24 hour(s)).

## 2022-03-17 NOTE — ASSESSMENT & PLAN NOTE
Improved with insulin adjustments  Follows with endo Dr. Merlin Pour nightly, [facility]  Foot exam notable for dry skin only  Discussed proper foot precautions

## 2022-04-29 ENCOUNTER — TELEPHONE (OUTPATIENT)
Dept: PRIMARY CARE CLINIC | Age: 62
End: 2022-04-29

## 2022-04-29 NOTE — TELEPHONE ENCOUNTER
----- Message from Phuong Maripanchitos sent at 4/29/2022  2:21 PM EDT -----  Subject: Message to Provider    QUESTIONS  Information for Provider? is there any thing that can be prescribed or   suggest for boils in the groin area? She has used some medication   clometrisome but they do not seem to be going away. What else can you   suggest or prescribe. [facility] in [place] on the [facility] [address] [facility]  ---------------------------------------------------------------------------  --------------  [address]  What is the best way for the office to contact you? OK to leave message on   voicemail  Preferred Call Back Phone Number? [phone]  ---------------------------------------------------------------------------  --------------  SCRIPT ANSWERS  Relationship to Patient?  Self

## 2022-05-02 ENCOUNTER — OFFICE VISIT (OUTPATIENT)
Dept: PRIMARY CARE CLINIC | Age: 62
End: 2022-05-02
Payer: COMMERCIAL

## 2022-05-02 VITALS
DIASTOLIC BLOOD PRESSURE: 70 MMHG | WEIGHT: 229.6 LBS | OXYGEN SATURATION: 98 % | HEIGHT: 65 IN | BODY MASS INDEX: 38.25 KG/M2 | SYSTOLIC BLOOD PRESSURE: 110 MMHG | TEMPERATURE: 98 F | RESPIRATION RATE: 18 BRPM | HEART RATE: 71 BPM

## 2022-05-02 DIAGNOSIS — L03.311 CELLULITIS OF RIGHT ABDOMINAL WALL: ICD-10-CM

## 2022-05-02 DIAGNOSIS — L02.211 ABSCESS OF ABDOMINAL WALL: Primary | ICD-10-CM

## 2022-05-02 PROCEDURE — 99213 OFFICE O/P EST LOW 20 MIN: CPT | Performed by: NURSE PRACTITIONER

## 2022-05-02 RX ORDER — CEPHALEXIN 500 MG/1
500 CAPSULE ORAL 3 TIMES DAILY
Qty: 30 CAPSULE | Refills: 0 | Status: SHIPPED | OUTPATIENT
Start: 2022-05-02 | End: 2022-05-12

## 2022-05-02 NOTE — PROGRESS NOTES
Chief Complaint:   Skin Lesion (boil located in abdomen, have had it for 2 weeks, got more painful, skin looking red and warm to the touch, quited taking tresiba due to this  )      History of Present Illness   Source of history provided by:  patient. Lawanda Tirado is a 62 y.o. old female who presents to express care for evaluation of redness to the RLQ, under abdominal fold, which began 2 weeks ago. Reports the area is warm to touch, moderately painful, and swollen. Denies lymphangitic streaking. Denies any bleeding or active drainage. Since onset, the symptoms have worsened. Denies any fever, chills, HA, recent illness, myalgias, nausea, vomiting, or lethargy. She reports that she had an area to umbilicus that was similar. She reports that it has opened and is now healing. ROS    Unless otherwise stated in this report or unable to obtain because of the patient's clinical or mental status as evidenced by the medical record, this patients's positive and negative responses for Review of Systems, constitutional, psych, eyes, ENT, cardiovascular, respiratory, gastrointestinal, neurological, genitourinary, musculoskeletal, integument systems and systems related to the presenting problem are either stated in the preceding or were not pertinent or were negative for the symptoms and/or complaints related to the medical problem. Past Medical History:  has a past medical history of Calculus of kidney and Hydronephrosis. Past Surgical History:  has a past surgical history that includes Lithotripsy; parathyroidectomy; Cholecystectomy; Appendectomy; and tendon manipulation. Social History:  reports that she has never smoked. She has never used smokeless tobacco. She reports that she does not drink alcohol and does not use drugs.   Family History: family history includes Breast Cancer (age of onset: 48) in her mother; Cancer in her mother; Cataracts in her daughter; Diabetes in her brother and father; Down Syndrome in her daughter; Glaucoma in her daughter; Hypertension in her brother, brother, and father; Kidney stones in her daughter and daughter; Vilma Spells in her mother; Other in her brother and sister; Thyroid Disease in her daughter. Allergies: Patient has no known allergies. Physical Exam   Vital Signs:  /70 (Site: Left Upper Arm, Position: Sitting, Cuff Size: Medium Adult)   Pulse 71   Temp 98 °F (36.7 °C) (Temporal)   Resp 18   Ht 5' 5\" (1.651 m)   Wt 229 lb 9.6 oz (104.1 kg)   SpO2 98%   BMI 38.21 kg/m²    Oxygen Saturation Interpretation: Normal.    Chaperone:  Teena H. Constitutional:  Alert, development consistent with age. NAD. Lungs:  CTAB without wheezing, rales, or rhonchi. Heart:  Regular rate and rhythm, no pathologic murmurs, rubs, or gallops. Skin:  Normal turgor and appropriately dry to touch. Raised, erythematous region over the the right groin/abdominal fold area, consistent with an abscess. Moderate TTP and warmth over the same area. No drainage noted. No lymphangitic streaking. Neurological:  Orientation age-appropriate unless noted elseware. Motor functions intact. Incision and Drainage     Lab / Imaging Results   (All laboratory and radiology results have been personally reviewed by myself)  Labs:  No results found for this visit on 05/02/22. Imaging: All Radiology results interpreted by Radiologist unless otherwise noted. No results found. Medical Decision Making         Assessment / Plan     Impression(s):  Ann Marie was seen today for skin lesion. Diagnoses and all orders for this visit:    Abscess of abdominal wall  Comments:  [facility]  Orders:  -     cephALEXin (KEFLEX) 500 MG capsule; Take 1 capsule by mouth 3 times daily for 10 days    Cellulitis of right abdominal wall        Scripts written for Keflex, side effects discussed. ED sooner if symptoms worsen or change.  ED immediately with the development of fever, body aches, shaking chills, lethargy, CP, or SOB. Pt is in agreement with this care plan. All questions answered. Return if symptoms worsen or fail to improve.     JARRET Valverde - NP

## 2022-05-02 NOTE — TELEPHONE ENCOUNTER
Pt called this am to see if she could get an antibiotic for boils. Advised pt to come through walk in clinic, pt verbalized understanding.

## 2022-05-04 ENCOUNTER — TELEPHONE (OUTPATIENT)
Dept: PRIMARY CARE CLINIC | Age: 62
End: 2022-05-04

## 2022-05-04 DIAGNOSIS — L02.211 ABSCESS OF ABDOMINAL WALL: Primary | ICD-10-CM

## 2022-05-04 RX ORDER — DOXYCYCLINE HYCLATE 100 MG
100 TABLET ORAL 2 TIMES DAILY
Qty: 14 TABLET | Refills: 0 | Status: SHIPPED | OUTPATIENT
Start: 2022-05-04 | End: 2022-05-11

## 2022-05-04 NOTE — TELEPHONE ENCOUNTER
Please advise her that I sent doxycycline 100 mg tablets -1 tab p.o. twice daily x7 days. This is in addition to her Keflex. I also would like her to either eat yogurt or take a probiotic, while being on 2 different antibiotics.

## 2022-05-04 NOTE — TELEPHONE ENCOUNTER
----- Message from [employer] sent at 5/4/2022  8:21 AM EDT -----  Subject: Message to Provider    QUESTIONS  Information for Provider? Patient states she saw Dr. Washington Graham on Monday. The medications he gave her don't seem to be working. .. Claude Archerz Gilberto in fact the boil   looks a bit worse. She has taken about 6-7 tablets. Please call to advise. Patient in concerned about Staph infection. Patient is also using a cream   that she had from a previous Dr. Please try home number first if can't   reach her there try other number as it is her cell.   ---------------------------------------------------------------------------  --------------  [address]  What is the best way for the office to contact you? OK to leave message on   voicemail  Preferred Call Back Phone Number? [phone]  ---------------------------------------------------------------------------  --------------  SCRIPT ANSWERS  Relationship to Patient?  Self

## 2022-05-10 ENCOUNTER — TELEPHONE (OUTPATIENT)
Dept: PRIMARY CARE CLINIC | Age: 62
End: 2022-05-10

## 2022-05-10 NOTE — TELEPHONE ENCOUNTER
Would recommend patient be reevaluated in walk-in clinic for acute concerns. Next available visit is 5/20.

## 2022-05-10 NOTE — TELEPHONE ENCOUNTER
----- Message from Neelima Merlyn sent at 5/10/2022  8:23 AM EDT -----  Subject: Appointment Request    Reason for Call: Routine Existing Condition Follow Up    QUESTIONS  Type of Appointment? Established Patient  Reason for appointment request? Available appointments did not meet   patient need  Additional Information for Provider? Patient needs an appt and there are   no appts available for patient. Please contact patient regarding this   message. Patient symptoms are boils on inside of legs that still look   infective.  ---------------------------------------------------------------------------  --------------  CALL BACK INFO  What is the best way for the office to contact you? OK to leave message on   voicemail  Preferred Call Back Phone Number? [phone]  ---------------------------------------------------------------------------  --------------  SCRIPT ANSWERS  Relationship to Patient? Self  Is this follow up request related to routine Diabetes Management? No  Have you been diagnosed with, awaiting test results for, or told that you   are suspected of having COVID-19 (Coronavirus)? (If patient has tested   negative or was tested as a requirement for work, school, or travel and   not based on symptoms, answer no)? No  Within the past 10 days have you developed any of the following symptoms   (answer no if symptoms have been present longer than 10 days or began   more than 10 days ago)? Fever or Chills, Cough, Shortness of breath or   difficulty breathing, Loss of taste or smell, Sore throat, Nasal   congestion, Sneezing or runny nose, Fatigue or generalized body aches   (answer no if pain is specific to a body part e.g. back pain), Diarrhea,   Headache? No  Have you had close contact with someone with COVID-19 in the last 7 days? No  (Service Expert  click yes below to proceed with [employer] As Usual   Scheduling)?  Yes

## 2022-05-12 ENCOUNTER — OFFICE VISIT (OUTPATIENT)
Dept: PRIMARY CARE CLINIC | Age: 62
End: 2022-05-12
Payer: COMMERCIAL

## 2022-05-12 VITALS
BODY MASS INDEX: 38.29 KG/M2 | HEIGHT: 65 IN | TEMPERATURE: 98.1 F | RESPIRATION RATE: 19 BRPM | OXYGEN SATURATION: 97 % | WEIGHT: 229.8 LBS | SYSTOLIC BLOOD PRESSURE: 120 MMHG | DIASTOLIC BLOOD PRESSURE: 70 MMHG | HEART RATE: 63 BPM

## 2022-05-12 DIAGNOSIS — L02.211 ABSCESS OF ABDOMINAL WALL: Primary | ICD-10-CM

## 2022-05-12 PROCEDURE — 99213 OFFICE O/P EST LOW 20 MIN: CPT | Performed by: NURSE PRACTITIONER

## 2022-05-12 RX ORDER — SULFAMETHOXAZOLE AND TRIMETHOPRIM 800; 160 MG/1; MG/1
1 TABLET ORAL 2 TIMES DAILY
Qty: 10 TABLET | Refills: 0 | Status: SHIPPED
Start: 2022-05-12 | End: 2022-05-17

## 2022-05-12 NOTE — PROGRESS NOTES
May 12, 2022     Sandrine Hawley 62 y.o. female   : 1960  Chief Complaint:   Skin Lesion (Re-check boil from last visit, she said is improving)       History of Present Illness:   Sandrine Hawley is a 62 y.o. female who presents to the office for reevaluation of redness to the right lower quadrant under abdominal fold. She was seen in walk-in clinic on 2022. She reports the area is warm to touch, moderately painful and swollen. She has tried triple antibiotic ointment and clotrimazole cream with no improvement of symptoms. Denies any lymphangitic streaking. Denies any bleeding or active drainage. Since onset of symptoms the symptoms have improved but have not completely resolved. At prior office visit, she was placed on Keflex which did not improve her symptoms and then additionally was placed on doxycycline for 7 days. She denies any fever, chills, headache, recent illness, myalgias, nausea, vomiting or lethargy. She reports that she had an area to the umbilical region that was similar but has healed completely. She does have a pertinent past medical history of diabetes mellitus which is poorly controlled. Last A1c 10.2.     Past Medical History:     Past Medical History:   Diagnosis Date    Calculus of kidney 2021    Hydronephrosis 3/17/2022       Past Surgical History:   Procedure Laterality Date    APPENDECTOMY      CHOLECYSTECTOMY      LITHOTRIPSY      PARATHYROIDECTOMY      TENDON MANIPULATION      left frozen shoulder       Family History   Problem Relation Age of Onset    Breast Cancer Mother 48        twice    Lung Cancer Mother     Cancer Mother         bladder   [employer] Diabetes Father     Hypertension Father     Other Sister         spina bifida    Hypertension Brother     Other Brother         diverticulitis    Kidney stones Daughter     Down Syndrome Daughter     Kidney stones Daughter     Thyroid Disease Daughter     Glaucoma Daughter         Louvenia Mcburney  Cataracts Daughter     Hypertension Brother     Diabetes Brother        Social History     Tobacco Use    Smoking status: Never Smoker    Smokeless tobacco: Never Used   Substance Use Topics    Alcohol use: Never    Drug use: Never       Medications:     Current Outpatient Medications:     collagenase (SANTYL) 250 UNIT/GM ointment, Apply topically daily. , Disp: 30 g, Rfl: 0    sulfamethoxazole-trimethoprim (BACTRIM DS;SEPTRA DS) 800-160 MG per tablet, Take 1 tablet by mouth 2 times daily for 5 days, Disp: 10 tablet, Rfl: 0    cephALEXin (KEFLEX) 500 MG capsule, Take 1 capsule by mouth 3 times daily for 10 days, Disp: 30 capsule, Rfl: 0    metoprolol tartrate (LOPRESSOR) 50 MG tablet, Take 1 tablet by mouth 2 times daily, Disp: 180 tablet, Rfl: 1    ibuprofen (ADVIL;MOTRIN) 600 MG tablet, ibuprofen 600 mg tablet  TAKE 1 TABLET BY MOUTH EVERY 8 HOURS AS NEEDED FOR PAIN, Disp: , Rfl:     vitamin D (CHOLECALCIFEROL) 25 MCG (1000 UT) TABS tablet, Take 2,000 Units by mouth daily, Disp: , Rfl:     Cyanocobalamin (B-12) 2500 MCG TABS, Take 2,500 mcg by mouth daily, Disp: , Rfl:     Blood Pressure KIT, Use to check blood pressure 2-3x weekly at home., Disp: 1 kit, Rfl: 0    Biotin 1 MG CAPS, Take by mouth, Disp: , Rfl:     clotrimazole-betamethasone (LOTRISONE) 1-0.05 % cream, Apply topically 2 times daily. , Disp: 45 g, Rfl: 0    FARXIGA 10 MG tablet, , Disp: , Rfl:     SYNTHROID 25 MCG tablet, , Disp: , Rfl:     furosemide (LASIX) 20 MG tablet, Take 20 mg by mouth as needed, Disp: , Rfl:     POTASSIUM CHLORIDE PO, Take by mouth as needed with furosemide, Disp: , Rfl:     Insulin Degludec (TRESIBA SC), Inject 24 Units into the skin every morning (Patient not taking: Reported on 5/2/2022), Disp: , Rfl:     No Known Allergies    Review of Systems:   Unless otherwise stated in this report the patient's positive and negative responses for review of systems for constitutional, eyes, ENT, cardiovascular, respiratory, gastrointestinal, neurological, , musculoskeletal, and integument systems and related systems to the presenting problem are either stated in the history of present illness or were not pertinent or were negative for the symptoms and/or complaints related to the presenting medical problem. Positives and pertinent negatives as per HPI. All others reviewed and are negative. Physical Exam:   Vital Signs:  /70 (Site: Left Upper Arm, Position: Sitting, Cuff Size: Medium Adult)   Pulse 63   Temp 98.1 °F (36.7 °C) (Temporal)   Resp 19   Ht 5' 5\" (1.651 m)   Wt 229 lb 12.8 oz (104.2 kg)   SpO2 97%   BMI 38.24 kg/m²    Oxygen Saturation Interpretation: Normal.    Physical Exam  Vitals and nursing note reviewed. Constitutional:       Appearance: Normal appearance. She is obese. HENT:      Head: Normocephalic and atraumatic. Right Ear: External ear normal.      Left Ear: External ear normal.      Nose: Nose normal.   Cardiovascular:      Rate and Rhythm: Normal rate and regular rhythm. Pulses: Normal pulses. Heart sounds: Normal heart sounds. Pulmonary:      Effort: Pulmonary effort is normal.      Breath sounds: Normal breath sounds. No wheezing, rhonchi or rales. Abdominal:      General: Bowel sounds are normal. There is no distension. Palpations: Abdomen is soft. Tenderness: There is no abdominal tenderness. There is no rebound. Musculoskeletal:         General: Normal range of motion. Cervical back: Normal range of motion and neck supple. Skin:     General: Skin is warm and dry. Capillary Refill: Capillary refill takes less than 2 seconds. Findings: Erythema and lesion present. Comments: There is an indurated region to the right lower quadrant under the abdominal fold and measuring approximately 6 cm x 3 cm. There is no fluctuance noted. There is slough present. She reports wound has significantly improved from previous office visit. There is no active drainage. There is no lymphangitic streaking. Neurological:      General: No focal deficit present. Mental Status: She is alert and oriented to person, place, and time. Psychiatric:         Mood and Affect: Mood normal.         Behavior: Behavior normal.         Thought Content: Thought content normal.         Judgment: Judgment normal.           Testing: All laboratory and radiology results have been personally reviewed by myself. Labs:  No results found for this visit on 05/12/22. Imaging: All Radiology results interpreted by Radiologist unless otherwise noted. No results found. Assessment/Plan:   I personally reviewed the patient's allergies, past medical history, medications, and vitals sign. Quita Carvajal was seen today for skin lesion. Diagnoses and all orders for this visit:    Abscess of abdominal wall  -     collagenase (SANTYL) 250 UNIT/GM ointment; Apply topically daily. -     sulfamethoxazole-trimethoprim (BACTRIM DS;SEPTRA DS) 800-160 MG per tablet; Take 1 tablet by mouth 2 times daily for 5 days      Extensive wound care instructions were discussed with the patient. She may shower and cleanse area with Dial soap. Do not vigorously scrub. Keep area clean and dry and covered. We will trial Santyl, side effects and administration instructions discussed. If cost is an issue  Medihoney over-the-counter. Continue Keflex as prescribed. Will give additional days of coverage with Bactrim. Increase yogurt consumption or take a probiotic daily. Call or go to ED immediately if symptoms worsen or persist.  Return in about 2 weeks for reevaluation. Counseled regarding above diagnosis, including possible risks and complications,especially if left uncontrolled. Counseled regarding the possible side effects, risks, benefits and alternatives to treatment; patient and/or guardian verbalizes understanding.  Advised patient to call with any new medication issues. All questions answered.     JARRET Ventura - NP

## 2022-05-17 ENCOUNTER — OFFICE VISIT (OUTPATIENT)
Dept: PRIMARY CARE CLINIC | Age: 62
End: 2022-05-17
Payer: COMMERCIAL

## 2022-05-17 VITALS
SYSTOLIC BLOOD PRESSURE: 138 MMHG | DIASTOLIC BLOOD PRESSURE: 82 MMHG | HEART RATE: 74 BPM | OXYGEN SATURATION: 97 % | HEIGHT: 65 IN | WEIGHT: 228.4 LBS | BODY MASS INDEX: 38.05 KG/M2 | TEMPERATURE: 97.6 F

## 2022-05-17 DIAGNOSIS — N18.1 TYPE 2 DIABETES MELLITUS WITH STAGE 1 CHRONIC KIDNEY DISEASE, WITH LONG-TERM CURRENT USE OF INSULIN (HCC): ICD-10-CM

## 2022-05-17 DIAGNOSIS — L03.311 CELLULITIS OF RIGHT ABDOMINAL WALL: Primary | ICD-10-CM

## 2022-05-17 DIAGNOSIS — Z79.4 TYPE 2 DIABETES MELLITUS WITH STAGE 1 CHRONIC KIDNEY DISEASE, WITH LONG-TERM CURRENT USE OF INSULIN (HCC): ICD-10-CM

## 2022-05-17 DIAGNOSIS — T14.8XXA NONHEALING NONSURGICAL WOUND: ICD-10-CM

## 2022-05-17 DIAGNOSIS — T36.95XA ANTIBIOTIC-INDUCED ALLERGIC RASH: ICD-10-CM

## 2022-05-17 DIAGNOSIS — L27.0 ANTIBIOTIC-INDUCED ALLERGIC RASH: ICD-10-CM

## 2022-05-17 DIAGNOSIS — E11.22 TYPE 2 DIABETES MELLITUS WITH STAGE 1 CHRONIC KIDNEY DISEASE, WITH LONG-TERM CURRENT USE OF INSULIN (HCC): ICD-10-CM

## 2022-05-17 PROCEDURE — 99214 OFFICE O/P EST MOD 30 MIN: CPT | Performed by: STUDENT IN AN ORGANIZED HEALTH CARE EDUCATION/TRAINING PROGRAM

## 2022-05-17 RX ORDER — CLINDAMYCIN HYDROCHLORIDE 300 MG/1
300 CAPSULE ORAL 3 TIMES DAILY
Qty: 21 CAPSULE | Refills: 0 | Status: SHIPPED | OUTPATIENT
Start: 2022-05-17 | End: 2022-05-24

## 2022-05-17 ASSESSMENT — ENCOUNTER SYMPTOMS
VOMITING: 0
DIARRHEA: 0
NAUSEA: 0

## 2022-05-17 NOTE — ASSESSMENT & PLAN NOTE
Follows with endo Dr. Nicole Troy  Had to stop [place] due to side effects, now uncontrolled  Endo started new medication, patient cannot remember the name  Zleda Montenegro

## 2022-05-17 NOTE — PROGRESS NOTES
SAME DAY VISIT    22  Name: Don Regalado   : 1960   Age: 62 y.o. Sex: female        Assessment & Plan:     Problem List Items Addressed This Visit        Endocrine    Type 2 diabetes mellitus with stage 1 chronic kidney disease, with long-term current use of insulin ([facility] [address]     Follows with endo Dr. Mag Herzog  Had to stop Bee Sanches due to side effects, now uncontrolled  Endo started new medication, patient cannot remember the name  Continue [place]           Other Visit Diagnoses     Cellulitis of right abdominal wall    -  Primary    New allergy to Bactrim  Trial clindamycin  Follow-up if recurrent signs of infection    Relevant Medications    clindamycin (CLEOCIN) 300 MG capsule    Other Relevant Orders    External Referral To Wound Clinic    Nonhealing nonsurgical wound        Suspect secondary to DM  Continue Santyl and dressing, local wound care  If does not continue to improve, advised patient to see wound care  Referral placed    Antibiotic-induced allergic rash        Suspect new left arm rash secondary to Bactrim  Stop Bactrim, added to allergies  Encouraged use of antihistamine as needed for itching          Counseled patient regarding above diagnosis, including possible risks and complications, especially if left uncontrolled. Counseled patient as appropriate and relevant regarding any possible side effects, risks, and alternatives to treatment; the patient verbalizes understanding, and is in agreement with the plan as detailed above. All educational materials and instructions were discussed and included on the After Visit Summary. All questions answered to the patient's satisfaction. The patient was advised to call for any concerns or return if any of the signs or symptoms worsen. **32 minutes was spent per charting, face-to-face with patient, and documenting on day of encounter. This provider and patient were wearing surgical masks.  We practiced social distancing when appropriate during visit due to COVID-19 pandemic. Subjective:     Chief Complaint   Patient presents with    Follow-up     boil on right side an reaction to some meds, noticed after she took the bactrim and now has rash on left arm        Patient has had boil on right abdomen for 4 weeks  Prescribed Keflex initially, did not improve, was changed to doxy after 48 hours  Was seen again last week, was improved but not resolved  Prescribed Bactrim, now has left arm itchy raised rash for 2 days    Review of Systems   Constitutional: Negative for chills and fever. Gastrointestinal: Negative for diarrhea, nausea and vomiting. Skin: Positive for rash and wound.        Medical History:     Patient Active Problem List   Diagnosis    Essential hypertension    Type 2 diabetes mellitus with stage 1 chronic kidney disease, with long-term current use of insulin ([facility])    Hypothyroidism    Lymphedema    Essential tremor    Arthritis    Adhesive capsulitis of left shoulder    Impingement syndrome of left shoulder    Vitamin D deficiency    Calculus of kidney    CKD (chronic kidney disease) stage 1, GFR 90 ml/min or greater        Past Medical History:   Diagnosis Date    Calculus of kidney 02/24/2021    Hydronephrosis 3/17/2022       Past Surgical History:   Procedure Laterality Date    APPENDECTOMY      CHOLECYSTECTOMY      LITHOTRIPSY      PARATHYROIDECTOMY      TENDON MANIPULATION      left frozen shoulder       Family History   Problem Relation Age of Onset    Breast Cancer Mother 48        twice    Lung Cancer Mother     Cancer Mother         bladder   Bhaskar Loser Diabetes Father     Hypertension Father     Other Sister         spina bifida    Hypertension Brother     Other Brother         diverticulitis    Kidney stones Daughter     Down Syndrome Daughter     Kidney stones Daughter     Thyroid Disease Daughter     Glaucoma Daughter         pre-glaucoma    Cataracts Daughter     Hypertension Brother     Diabetes Brother        Medications:     Current Outpatient Medications:     collagenase (SANTYL) 250 UNIT/GM ointment, Apply topically daily. , Disp: 30 g, Rfl: 0    metoprolol tartrate (LOPRESSOR) 50 MG tablet, Take 1 tablet by mouth 2 times daily, Disp: 180 tablet, Rfl: 1    ibuprofen (ADVIL;MOTRIN) 600 MG tablet, ibuprofen 600 mg tablet  TAKE 1 TABLET BY MOUTH EVERY 8 HOURS AS NEEDED FOR PAIN, Disp: , Rfl:     vitamin D (CHOLECALCIFEROL) 25 MCG (1000 UT) TABS tablet, Take 2,000 Units by mouth daily, Disp: , Rfl:     Cyanocobalamin (B-12) 2500 MCG TABS, Take 2,500 mcg by mouth daily, Disp: , Rfl:     Blood Pressure KIT, Use to check blood pressure 2-3x weekly at home., Disp: 1 kit, Rfl: 0    Biotin 1 MG CAPS, Take by mouth, Disp: , Rfl:     clotrimazole-betamethasone (LOTRISONE) 1-0.05 % cream, Apply topically 2 times daily. , Disp: 45 g, Rfl: 0    FARXIGA 10 MG tablet, , Disp: , Rfl:     SYNTHROID 25 MCG tablet, , Disp: , Rfl:     furosemide (LASIX) 20 MG tablet, Take 20 mg by mouth as needed, Disp: , Rfl:     POTASSIUM CHLORIDE PO, Take by mouth as needed with furosemide, Disp: , Rfl:     sulfamethoxazole-trimethoprim (BACTRIM DS;SEPTRA DS) 800-160 MG per tablet, Take 1 tablet by mouth 2 times daily for 5 days (Patient not taking: Reported on 5/17/2022), Disp: 10 tablet, Rfl: 0    Allergies:   No Known Allergies    Social History:     Social History     Socioeconomic History    Marital status:      Spouse name: Not on file    Number of children: Not on file    Years of education: Not on file    Highest education level: Not on file   Occupational History    Not on file   Tobacco Use    Smoking status: Never Smoker    Smokeless tobacco: Never Used   Substance and Sexual Activity    Alcohol use: Never    Drug use: Never    Sexual activity: Yes     Partners: Male     Comment:    Other Topics Concern    Not on file   Social History Narrative    Not on file     Social Determinants of Health     Financial Resource Strain: Low Risk     Difficulty of Paying Living Expenses: Not hard at all   Food Insecurity: No Food Insecurity    Worried About Running Out of Food in the Last Year: Never true    Hemant of Food in the Last Year: Never true   Transportation Needs:     Lack of Transportation (Medical): Not on file    Lack of Transportation (Non-Medical): Not on file   Physical Activity:     Days of Exercise per Week: Not on file    Minutes of Exercise per Session: Not on file   Stress:     Feeling of Stress : Not on file   Social Connections:     Frequency of Communication with Friends and Family: Not on file    Frequency of Social Gatherings with Friends and Family: Not on file    Attends Sikhism Services: Not on file    Active Member of [address] [place] [address] [employer] or Organizations: Not on file    Attends Club or Organization Meetings: Not on file    Marital Status: Not on file   Intimate Partner Violence:     Fear of Current or Ex-Partner: Not on file    Emotionally Abused: Not on file    Physically Abused: Not on file    Sexually Abused: Not on file   Housing Stability:     Unable to Pay for Housing in the Last Year: Not on file    Number of [place] in the Last Year: Not on file    Unstable Housing in the Last Year: Not on file       Physical Exam:     Vitals:    05/17/22 1129   BP: 138/82   Site: Left Upper Arm   Position: Sitting   Pulse: 74   Temp: 97.6 °F (36.4 °C)   SpO2: 97%   Weight: 228 lb 6.4 oz (103.6 kg)   Height: 5' 5\" (1.651 m)        Physical Exam  Vitals and nursing note reviewed. Constitutional:       General: She is not in acute distress. Appearance: Normal appearance. She is obese. She is not ill-appearing or diaphoretic. Eyes:      Extraocular Movements: Extraocular movements intact. Conjunctiva/sclera: Conjunctivae normal.   Cardiovascular:      Rate and Rhythm: Normal rate. Pulmonary:      Effort: No respiratory distress.    Abdominal: Comments: Open wound with wet scab and sloughing right lower quadrant of abdomen. Induration noted below wound, nontender to palpation. No erythema, fluctuance or drainage. Skin:     General: Skin is warm and dry. Neurological:      Mental Status: She is alert and oriented to person, place, and time. Testing:     Orders Placed This Encounter   Procedures    External Referral To Wound Clinic     Referral Priority:   Routine     Referral Type:   Eval and Treat     Referral Reason:   Specialty Services Required     Requested Specialty:   Wound Care     Number of Visits Requested:   1        No results found for this or any previous visit (from the past 24 hour(s)).

## 2022-06-08 ENCOUNTER — TELEPHONE (OUTPATIENT)
Dept: PRIMARY CARE CLINIC | Age: 62
End: 2022-06-08

## 2022-06-08 DIAGNOSIS — K52.9 COLITIS PRESUMED TO BE DUE TO INFECTION: Primary | ICD-10-CM

## 2022-06-08 NOTE — TELEPHONE ENCOUNTER
Patient called saying she was in the hospital due to colitis, she can not make a ER follow up because she is going to travel, so she is wondering if you can prescribe anything for the diarrhea and cramps. Please advise.

## 2022-06-08 NOTE — TELEPHONE ENCOUNTER
Need records from hospitalization. Unsure where patient was seen, suspect LONG TERM ACUTE [facility] due to patient's home address.

## 2022-06-09 RX ORDER — METRONIDAZOLE 500 MG/1
500 TABLET ORAL 3 TIMES DAILY
Qty: 21 TABLET | Refills: 0 | Status: SHIPPED | OUTPATIENT
Start: 2022-06-09 | End: 2022-06-16

## 2022-06-09 RX ORDER — CIPROFLOXACIN 500 MG/1
500 TABLET, FILM COATED ORAL 2 TIMES DAILY
Qty: 14 TABLET | Refills: 0 | Status: SHIPPED | OUTPATIENT
Start: 2022-06-09 | End: 2022-06-16

## 2022-06-09 RX ORDER — DICYCLOMINE HYDROCHLORIDE 10 MG/1
10 CAPSULE ORAL 4 TIMES DAILY PRN
Qty: 56 CAPSULE | Refills: 0 | Status: SHIPPED | OUTPATIENT
Start: 2022-06-09 | End: 2022-06-23

## 2022-06-09 NOTE — TELEPHONE ENCOUNTER
Pt leaves for vacation on Monday so would need prescription asap and it can be sent to [facility] in [place].

## 2022-06-09 NOTE — TELEPHONE ENCOUNTER
Reviewed records and spoke with patient. Patient was diagnosed with mild rectosigmoid colitis due to bloody diarrhea and inflammation on CT. Patient was not given any medication for treatment of infection or symptoms. She was advised to follow-up with GI who cannot get her in until July. Patient symptoms have not improved in 2 days. Discussed risks with patient of treatment over phone given I have not examined her, however I was able to review her ED records and working diagnosis. Patient unable to return to ED or come in to walk-in. Therefore will treat for presumed infectious colitis with Cipro/Flagyl. Will also provide Bentyl for symptomatic relief. Encouraged to avoid Imodium or Pepto which could lead to worsening infection given diarrhea is body's way of clearing the infection. Also encouraged to increase fluid intake. Discussed return precautions for lack of improvement or worsening of diarrhea, fever, lightheadedness, abdominal pain, vomiting, dehydration. Discussed also risks of antibiotics, patient would like to proceed.

## 2022-06-10 ENCOUNTER — TELEPHONE (OUTPATIENT)
Dept: PRIMARY CARE CLINIC | Age: 62
End: 2022-06-10

## 2022-06-10 NOTE — TELEPHONE ENCOUNTER
Pt concerned about finishing the antibiotic cipro because she read that colitis could come back when she finishes it or make it worse. Also would like to know if colitis is contagious. please advise.

## 2022-06-10 NOTE — TELEPHONE ENCOUNTER
The risk of worsening colitis is only in cases of C. difficile colitis which patient does not have risk factors for and do not suspect this to be the cause of her colitis. Do not suspect that her colitis is contagious either.

## 2022-08-03 LAB
AVERAGE GLUCOSE: NORMAL
HBA1C MFR BLD: 12.5 %

## 2022-08-26 DIAGNOSIS — I10 ESSENTIAL HYPERTENSION: ICD-10-CM

## 2022-08-31 DIAGNOSIS — I10 ESSENTIAL HYPERTENSION: ICD-10-CM

## 2022-09-01 RX ORDER — METOPROLOL TARTRATE 50 MG/1
50 TABLET, FILM COATED ORAL 2 TIMES DAILY
Qty: 28 TABLET | Refills: 0 | Status: SHIPPED
Start: 2022-09-01 | End: 2022-09-01 | Stop reason: SDUPTHER

## 2022-09-01 RX ORDER — METOPROLOL TARTRATE 50 MG/1
50 TABLET, FILM COATED ORAL 2 TIMES DAILY
Qty: 180 TABLET | Refills: 1 | Status: SHIPPED | OUTPATIENT
Start: 2022-09-01

## 2022-09-01 RX ORDER — METOPROLOL TARTRATE 50 MG/1
50 TABLET, FILM COATED ORAL 2 TIMES DAILY
Qty: 180 TABLET | Refills: 1 | Status: CANCELLED | OUTPATIENT
Start: 2022-09-01

## 2022-09-01 NOTE — TELEPHONE ENCOUNTER
I apologize, I did not see her previous request.  Please call her to let her know that a 2-week refill was sent to [address] [place] [address]. She will need to call us when she picks this up so that I can send through [facility] otherwise she will not be able to get both.

## 2022-09-15 ENCOUNTER — OFFICE VISIT (OUTPATIENT)
Dept: PRIMARY CARE CLINIC | Age: 62
End: 2022-09-15
Payer: COMMERCIAL

## 2022-09-15 VITALS
HEART RATE: 74 BPM | HEIGHT: 65 IN | TEMPERATURE: 97.9 F | BODY MASS INDEX: 36.32 KG/M2 | RESPIRATION RATE: 20 BRPM | DIASTOLIC BLOOD PRESSURE: 80 MMHG | OXYGEN SATURATION: 97 % | WEIGHT: 218 LBS | SYSTOLIC BLOOD PRESSURE: 120 MMHG

## 2022-09-15 DIAGNOSIS — B36.9 FUNGAL INFECTION OF SKIN OF ABDOMEN: ICD-10-CM

## 2022-09-15 DIAGNOSIS — L02.416 ABSCESS OF LEFT THIGH: Primary | ICD-10-CM

## 2022-09-15 PROCEDURE — 99213 OFFICE O/P EST LOW 20 MIN: CPT | Performed by: NURSE PRACTITIONER

## 2022-09-15 RX ORDER — CEFDINIR 300 MG/1
300 CAPSULE ORAL 2 TIMES DAILY
Qty: 20 CAPSULE | Refills: 0 | Status: SHIPPED | OUTPATIENT
Start: 2022-09-15 | End: 2022-09-25

## 2022-09-15 RX ORDER — GREEN TEA/HOODIA GORDONII 315-12.5MG
1 CAPSULE ORAL DAILY
Qty: 30 TABLET | Refills: 0 | Status: SHIPPED | OUTPATIENT
Start: 2022-09-15 | End: 2022-10-15

## 2022-09-15 RX ORDER — NYSTATIN 100000 [USP'U]/G
POWDER TOPICAL 3 TIMES DAILY PRN
Qty: 60 G | Refills: 3 | Status: SHIPPED
Start: 2022-09-15 | End: 2022-10-19 | Stop reason: SDUPTHER

## 2022-09-15 SDOH — ECONOMIC STABILITY: FOOD INSECURITY: WITHIN THE PAST 12 MONTHS, THE FOOD YOU BOUGHT JUST DIDN'T LAST AND YOU DIDN'T HAVE MONEY TO GET MORE.: NEVER TRUE

## 2022-09-15 SDOH — ECONOMIC STABILITY: FOOD INSECURITY: WITHIN THE PAST 12 MONTHS, YOU WORRIED THAT YOUR FOOD WOULD RUN OUT BEFORE YOU GOT MONEY TO BUY MORE.: NEVER TRUE

## 2022-09-15 ASSESSMENT — PATIENT HEALTH QUESTIONNAIRE - PHQ9
1. LITTLE INTEREST OR PLEASURE IN DOING THINGS: 0
SUM OF ALL RESPONSES TO PHQ QUESTIONS 1-9: 0
SUM OF ALL RESPONSES TO PHQ QUESTIONS 1-9: 0
SUM OF ALL RESPONSES TO PHQ9 QUESTIONS 1 & 2: 0
SUM OF ALL RESPONSES TO PHQ QUESTIONS 1-9: 0
SUM OF ALL RESPONSES TO PHQ QUESTIONS 1-9: 0
2. FEELING DOWN, DEPRESSED OR HOPELESS: 0

## 2022-09-15 ASSESSMENT — SOCIAL DETERMINANTS OF HEALTH (SDOH): HOW HARD IS IT FOR YOU TO PAY FOR THE VERY BASICS LIKE FOOD, HOUSING, MEDICAL CARE, AND HEATING?: NOT HARD AT ALL

## 2022-09-15 NOTE — PROGRESS NOTES
September 15, 2022     Allison Win 65 y.o. female    : 1960   Chief Complaint   Leg Pain (Left leg pain in the groin area, but she has a \"boil\" that looks different as the ones she usually gets because it is filled with blood this time, started on 22 )      History of Present Illness   Source of history provided by:  patient. Allison Win is a 65 y.o. old female who presents to walk-in care for evaluation of redness to the left upper thigh, which began 1 days ago. Reports the area is warm to touch, moderately painful, and swollen. There is no lymphangitic streaking. Denies any bleeding or active drainage. There is an intact blood blister present. Since onset the symptoms have progressed. Denies any fever, chills, HA, recent illness, myalgias, nausea, vomiting, or lethargy. ROS   Past Medical History:   Past Medical History:   Diagnosis Date    Calculus of kidney 2021    Hydronephrosis 3/17/2022     Past Surgical History:  has a past surgical history that includes Lithotripsy; parathyroidectomy; Cholecystectomy; Appendectomy; and tendon manipulation. Social History:  reports that she has never smoked. She has never used smokeless tobacco. She reports that she does not drink alcohol and does not use drugs. Family History: family history includes Breast Cancer (age of onset: 48) in her mother; Cancer in her mother; Cataracts in her daughter; Diabetes in her brother and father; Down Syndrome in her daughter; Glaucoma in her daughter; Hypertension in her brother, brother, and father; Kidney stones in her daughter and daughter; Tollie Nimesh in her mother; Other in her brother and sister; Thyroid Disease in her daughter.    Allergies: Bactrim [sulfamethoxazole-trimethoprim]    Unless otherwise stated in this report the patient's positive and negative responses for review of systems for constitutional, eyes, ENT, cardiovascular, respiratory, gastrointestinal, neurological, , blood blister. If blood blister opens spontaneously, apply triple antibiotic ointment and a nonadherent dressing. Keep area open to air is much as possible. Advise f/u with PCP in 2-3 days for reevaluation. ED sooner if symptoms worsen or change. ED immediately with the development of fever, body aches, shaking chills, lethargy, CP, or SOB. Pt is in agreement with this care plan. All questions answered.     Electronically signed by JARRET Guzman NP on 9/15/2022 at 1:47 PM

## 2022-09-19 ENCOUNTER — TELEPHONE (OUTPATIENT)
Dept: PRIMARY CARE CLINIC | Age: 62
End: 2022-09-19

## 2022-09-19 NOTE — TELEPHONE ENCOUNTER
A seen 9/15 for  abscess and diarrhea-  still having diarrhea wants to know if she should take 2  probiotics instead of  one or do you suggest something else ?     Please advise

## 2022-09-20 ENCOUNTER — OFFICE VISIT (OUTPATIENT)
Dept: PRIMARY CARE CLINIC | Age: 62
End: 2022-09-20
Payer: COMMERCIAL

## 2022-09-20 VITALS
SYSTOLIC BLOOD PRESSURE: 128 MMHG | TEMPERATURE: 97.1 F | HEART RATE: 65 BPM | OXYGEN SATURATION: 97 % | BODY MASS INDEX: 36.92 KG/M2 | HEIGHT: 65 IN | WEIGHT: 221.6 LBS | DIASTOLIC BLOOD PRESSURE: 84 MMHG | RESPIRATION RATE: 16 BRPM

## 2022-09-20 DIAGNOSIS — G25.0 ESSENTIAL TREMOR: ICD-10-CM

## 2022-09-20 DIAGNOSIS — Z79.4 TYPE 2 DIABETES MELLITUS WITH STAGE 1 CHRONIC KIDNEY DISEASE, WITH LONG-TERM CURRENT USE OF INSULIN (HCC): Primary | ICD-10-CM

## 2022-09-20 DIAGNOSIS — E11.22 TYPE 2 DIABETES MELLITUS WITH STAGE 1 CHRONIC KIDNEY DISEASE, WITH LONG-TERM CURRENT USE OF INSULIN (HCC): Primary | ICD-10-CM

## 2022-09-20 DIAGNOSIS — R19.7 DIARRHEA, UNSPECIFIED TYPE: ICD-10-CM

## 2022-09-20 DIAGNOSIS — I89.0 LYMPHEDEMA: ICD-10-CM

## 2022-09-20 DIAGNOSIS — N18.1 CKD (CHRONIC KIDNEY DISEASE) STAGE 1, GFR 90 ML/MIN OR GREATER: ICD-10-CM

## 2022-09-20 DIAGNOSIS — L02.416 ABSCESS OF LEFT THIGH: ICD-10-CM

## 2022-09-20 DIAGNOSIS — I10 ESSENTIAL HYPERTENSION: ICD-10-CM

## 2022-09-20 DIAGNOSIS — Z12.31 SCREENING MAMMOGRAM FOR BREAST CANCER: ICD-10-CM

## 2022-09-20 DIAGNOSIS — N18.1 TYPE 2 DIABETES MELLITUS WITH STAGE 1 CHRONIC KIDNEY DISEASE, WITH LONG-TERM CURRENT USE OF INSULIN (HCC): Primary | ICD-10-CM

## 2022-09-20 PROCEDURE — 3046F HEMOGLOBIN A1C LEVEL >9.0%: CPT | Performed by: STUDENT IN AN ORGANIZED HEALTH CARE EDUCATION/TRAINING PROGRAM

## 2022-09-20 PROCEDURE — 3078F DIAST BP <80 MM HG: CPT | Performed by: STUDENT IN AN ORGANIZED HEALTH CARE EDUCATION/TRAINING PROGRAM

## 2022-09-20 PROCEDURE — 99214 OFFICE O/P EST MOD 30 MIN: CPT | Performed by: STUDENT IN AN ORGANIZED HEALTH CARE EDUCATION/TRAINING PROGRAM

## 2022-09-20 PROCEDURE — 3074F SYST BP LT 130 MM HG: CPT | Performed by: STUDENT IN AN ORGANIZED HEALTH CARE EDUCATION/TRAINING PROGRAM

## 2022-09-20 RX ORDER — INSULIN GLARGINE 300 U/ML
INJECTION, SOLUTION SUBCUTANEOUS
COMMUNITY
Start: 2022-08-31

## 2022-09-20 ASSESSMENT — ENCOUNTER SYMPTOMS
NAUSEA: 0
VOMITING: 0
DIARRHEA: 1
SHORTNESS OF BREATH: 0

## 2022-09-20 NOTE — TELEPHONE ENCOUNTER
Only taking 1 probiotic. She was on an antibiotic which increases her risk for C. difficile/diarrhea.   If diarrhea is still persistent she needs to be reevaluated in walk-in

## 2022-09-20 NOTE — PROGRESS NOTES
ESTABLISHED PRIMARY CARE VISIT    22  Name: Allyson Lorenzo   : 1960   Age: 65 y.o. Sex: female        Assessment & Plan:     Problem List Items Addressed This Visit          Circulatory    Essential hypertension     Chronic, controlled  Continue metoprolol 50mg BID  If elevated in future, consider nightly ACE-I or ARB given diabetes  Annual labs next visit            Endocrine    Type 2 diabetes mellitus with stage 1 chronic kidney disease, with long-term current use of insulin (HCC) - Primary     Chronic, uncontrolled  Follows with endo Nikita Samuels         Relevant Medications    TOUJEO SOLOSTAR 300 UNIT/ML SOPN       Nervous and Auditory    Essential tremor     Chronic  No suspicious characteristics  Suspect benign familial tremor given family history            Genitourinary    CKD (chronic kidney disease) stage 1, GFR 90 ml/min or greater     Annual labs next visit            Other    Lymphedema     Chronic, controlled  Continue Lasix 20 mg daily          Other Visit Diagnoses       Abscess of left thigh        S/p spontaneous drainage  Complete cefdinir  Provided mupirocin for open wound    Diarrhea, unspecified type        Loose, not watery  Likely secondary to antibiotic  Suspect will self resolve, advised to eat with cefdinir  Advised to call if worsens or does not improve    Screening mammogram for breast cancer        Relevant Orders    [facility] FREDI DIGITAL SCREEN BILATERAL PER PROTOCOL            Counseled patient regarding above diagnosis, including possible risks and complications, especially if left uncontrolled. Counseled patient as appropriate and relevant regarding any possible side effects, risks, and alternatives to treatment; the patient verbalizes understanding, and is in agreement with the plan as detailed above. All educational materials and instructions were discussed and included on the After Visit Summary.   All questions answered to the patient's satisfaction. The patient was advised to call for any concerns prior to next appointment. Return in about 3 months (around 12/20/2022) for follow up and wellness if needed. This provider and patient were wearing surgical masks and practiced social distancing when appropriate during visit due to COVID-19 pandemic. Subjective:     Chief Complaint   Patient presents with    6 Month Follow-Up     C/o shakyness and was seen last thurs for abcess left thigh. Was put on cefdinir 300mg 1 tab bid x 10 days. Seems to be getting better but still there. Patient returns for follow-up  Was started on Cefdinir for abscess  Abscess drained at home  Reports diarrhea 1-2 times daily on Cefdinir  Does not always eat with it    Reports tremor in hands and sometimes jaw for several months to year  Father had tremor in head and neck  Grandfather had tremor in hands and head      Review of Systems   Constitutional:  Negative for chills and fever. Respiratory:  Negative for shortness of breath. Cardiovascular:  Positive for leg swelling. Negative for chest pain. Gastrointestinal:  Positive for diarrhea. Negative for nausea and vomiting. Skin:  Positive for wound. Negative for rash. Neurological:  Positive for tremors.        Medical History:     Patient Active Problem List   Diagnosis    Essential hypertension    Type 2 diabetes mellitus with stage 1 chronic kidney disease, with long-term current use of insulin (HCC)    Hypothyroidism    Lymphedema    Essential tremor    Arthritis    Adhesive capsulitis of left shoulder    Impingement syndrome of left shoulder    Vitamin D deficiency    Calculus of kidney    CKD (chronic kidney disease) stage 1, GFR 90 ml/min or greater        Past Medical History:   Diagnosis Date    Calculus of kidney 02/24/2021    Hydronephrosis 3/17/2022       Past Surgical History:   Procedure Laterality Date    APPENDECTOMY      CHOLECYSTECTOMY      LITHOTRIPSY PARATHYROIDECTOMY      TENDON MANIPULATION      left frozen shoulder       Family History   Problem Relation Age of Onset    Breast Cancer Mother 48        twice    Lung Cancer Mother     Cancer Mother         bladder    Diabetes Father     Hypertension Father     Other Sister         spina bifida    Hypertension Brother     Other Brother         diverticulitis    Kidney stones Daughter     Down Syndrome Daughter     Kidney stones Daughter     Thyroid Disease Daughter     Glaucoma Daughter         pre-glaucoma    Cataracts Daughter     Hypertension Brother     Diabetes Brother        Medications:     Current Outpatient Medications:     TOUJEO SOLOSTAR 300 UNIT/ML SOPN, Inject 24 units daily in morning, Disp: , Rfl:     cefdinir (OMNICEF) 300 MG capsule, Take 1 capsule by mouth 2 times daily for 10 days, Disp: 20 capsule, Rfl: 0    Probiotic Acidophilus (FLORANEX) TABS, Take 1 tablet by mouth daily, Disp: 30 tablet, Rfl: 0    nystatin (MYCOSTATIN) 227222 UNIT/GM powder, Apply topically 3 times daily as needed (abdominal folds/ breast folds), Disp: 60 g, Rfl: 3    metoprolol tartrate (LOPRESSOR) 50 MG tablet, Take 1 tablet by mouth 2 times daily, Disp: 180 tablet, Rfl: 1    ibuprofen (ADVIL;MOTRIN) 600 MG tablet, ibuprofen 600 mg tablet  TAKE 1 TABLET BY MOUTH EVERY 8 HOURS AS NEEDED FOR PAIN, Disp: , Rfl:     vitamin D (CHOLECALCIFEROL) 25 MCG (1000 UT) TABS tablet, Take 2,000 Units by mouth daily, Disp: , Rfl:     Cyanocobalamin (B-12) 2500 MCG TABS, Take 2,500 mcg by mouth daily, Disp: , Rfl:     Blood Pressure KIT, Use to check blood pressure 2-3x weekly at home., Disp: 1 kit, Rfl: 0    Biotin 1 MG CAPS, Take 1 capsule by mouth daily, Disp: , Rfl:     clotrimazole-betamethasone (LOTRISONE) 1-0.05 % cream, Apply topically 2 times daily.  (Patient taking differently: Apply topically 2 times daily as needed.), Disp: 45 g, Rfl: 0    FARXIGA 10 MG tablet, Take 10 mg by mouth daily, Disp: , Rfl:     SYNTHROID 25 MCG tablet, Take 25 mcg by mouth Daily And 2 tabs on sat and sunday, Disp: , Rfl:     furosemide (LASIX) 20 MG tablet, Take 20 mg by mouth as needed, Disp: , Rfl:     POTASSIUM CHLORIDE PO, Take by mouth as needed with furosemide, Disp: , Rfl:     dicyclomine (BENTYL) 10 MG capsule, Take 1 capsule by mouth 4 times daily as needed (stomach cramping) (Patient not taking: Reported on 9/20/2022), Disp: 56 capsule, Rfl: 0    Allergies:      Allergies   Allergen Reactions    Bactrim [Sulfamethoxazole-Trimethoprim] Rash       Social History:     Social History     Socioeconomic History    Marital status:      Spouse name: Not on file    Number of children: Not on file    Years of education: Not on file    Highest education level: Not on file   Occupational History    Not on file   Tobacco Use    Smoking status: Never    Smokeless tobacco: Never   Substance and Sexual Activity    Alcohol use: Never    Drug use: Never    Sexual activity: Yes     Partners: Male     Comment:    Other Topics Concern    Not on file   Social History Narrative    Not on file     Social Determinants of Health     Financial Resource Strain: Low Risk     Difficulty of Paying Living Expenses: Not hard at all   Food Insecurity: No Food Insecurity    Worried About Running Out of Food in the Last Year: Never true    Ran Out of Food in the Last Year: Never true   Transportation Needs: Not on file   Physical Activity: Not on file   Stress: Not on file   Social Connections: Not on file   Intimate Partner Violence: Not on file   Housing Stability: Not on file       Physical Exam:     Vitals:    09/20/22 0956 09/20/22 1003 09/20/22 1029   BP: (!) 150/80 (!) 140/80 128/84   Site: Left Upper Arm Left Upper Arm Left Upper Arm   Position:   Sitting   Cuff Size:   Medium Adult   Pulse: 65     Resp: 16     Temp: 97.1 °F (36.2 °C)     SpO2: 97%     Weight: 221 lb 9.6 oz (100.5 kg)     Height: 5' 5\" (1.651 m)         BP Readings from Last 3 Encounters: 22 128/84   09/15/22 120/80   22 138/82       Wt Readings from Last 3 Encounters:   22 221 lb 9.6 oz (100.5 kg)   09/15/22 218 lb (98.9 kg)   22 228 lb 6.4 oz (103.6 kg)       Physical Exam  Vitals and nursing note reviewed. Constitutional:       General: She is not in acute distress. Appearance: Normal appearance. She is obese. She is not ill-appearing or diaphoretic. Cardiovascular:      Rate and Rhythm: Normal rate and regular rhythm. Heart sounds: Normal heart sounds. Pulmonary:      Effort: Pulmonary effort is normal. No respiratory distress. Breath sounds: Normal breath sounds. Musculoskeletal:      Right lower le+ Edema present. Left lower le+ Edema present. Skin:     General: Skin is warm and dry. Comments: Left upper anterior thigh with open wound. No current drainage. Mild erythema without induration or fluctuance. Neurological:      Mental Status: She is alert and oriented to person, place, and time. Psychiatric:         Mood and Affect: Mood normal.         Behavior: Behavior normal.       Testing:     Orders Placed This Encounter   Procedures    [facility] FREDI DIGITAL SCREEN BILATERAL PER PROTOCOL     Further imaging can be completed per [address] protocol     Standing Status:   Future     Standing Expiration Date:   2023     Scheduling Instructions:      November     Order Specific Question:   Where will the exam be performed? Answer:   [facility]     Comments:   [facility]        No results found for this or any previous visit (from the past 24 hour(s)).

## 2022-10-19 DIAGNOSIS — B36.9 FUNGAL INFECTION OF SKIN OF ABDOMEN: ICD-10-CM

## 2022-10-19 RX ORDER — NYSTATIN 100000 [USP'U]/G
POWDER TOPICAL 3 TIMES DAILY PRN
Qty: 60 G | Refills: 3 | Status: SHIPPED | OUTPATIENT
Start: 2022-10-19

## 2022-11-09 LAB
CHOLESTEROL, TOTAL: NORMAL
CHOLESTEROL/HDL RATIO: NORMAL
HDLC SERPL-MCNC: NORMAL MG/DL
LDL CHOLESTEROL CALCULATED: NORMAL
LDL CHOLESTEROL DIRECT: NORMAL
TRIGL SERPL-MCNC: NORMAL MG/DL
VLDLC SERPL CALC-MCNC: NORMAL MG/DL

## 2022-12-10 NOTE — ASSESSMENT & PLAN NOTE
Chronic, controlled  Continue metoprolol 50mg BID  If elevated in future, consider nightly ACE-I or ARB given diabetes  Annual labs next visit

## 2023-01-01 NOTE — TELEPHONE ENCOUNTER
Assessment completed. Plan of care reviewed with parent. Infant bundled in open crib with parent.    Pt stated she has one more day of antibiotics and if no better by thur.  Will come in to express care

## 2023-01-18 ENCOUNTER — HOSPITAL ENCOUNTER (OUTPATIENT)
Dept: MAMMOGRAPHY | Age: 63
Discharge: HOME | End: 2023-01-20
Payer: COMMERCIAL

## 2023-01-18 VITALS — BODY MASS INDEX: 35.82 KG/M2 | WEIGHT: 215 LBS | HEIGHT: 65 IN

## 2023-01-18 DIAGNOSIS — Z12.31 SCREENING MAMMOGRAM FOR BREAST CANCER: ICD-10-CM

## 2023-01-18 PROCEDURE — 77063 BREAST TOMOSYNTHESIS BI: CPT

## 2023-03-13 DIAGNOSIS — I10 ESSENTIAL HYPERTENSION: ICD-10-CM

## 2023-03-14 RX ORDER — METOPROLOL TARTRATE 50 MG/1
50 TABLET, FILM COATED ORAL 2 TIMES DAILY
Qty: 180 TABLET | Refills: 1 | Status: SHIPPED | OUTPATIENT
Start: 2023-03-14

## 2023-03-21 LAB
ALBUMIN SERPL-MCNC: NORMAL G/DL
ALP BLD-CCNC: NORMAL U/L
ALT SERPL-CCNC: NORMAL U/L
ANION GAP SERPL CALCULATED.3IONS-SCNC: NORMAL MMOL/L
AST SERPL-CCNC: NORMAL U/L
AVERAGE GLUCOSE: NORMAL
BILIRUB SERPL-MCNC: NORMAL MG/DL
BUN BLDV-MCNC: NORMAL MG/DL
CALCIUM SERPL-MCNC: NORMAL MG/DL
CHLORIDE BLD-SCNC: NORMAL MMOL/L
CO2: NORMAL
CREAT SERPL-MCNC: NORMAL MG/DL
EGFR: 98
GLUCOSE BLD-MCNC: NORMAL MG/DL
HBA1C MFR BLD: 10.2 %
POTASSIUM SERPL-SCNC: NORMAL MMOL/L
SODIUM BLD-SCNC: NORMAL MMOL/L
TOTAL PROTEIN: NORMAL

## 2023-05-22 SDOH — ECONOMIC STABILITY: FOOD INSECURITY: WITHIN THE PAST 12 MONTHS, YOU WORRIED THAT YOUR FOOD WOULD RUN OUT BEFORE YOU GOT MONEY TO BUY MORE.: NEVER TRUE

## 2023-05-22 SDOH — ECONOMIC STABILITY: HOUSING INSECURITY
IN THE LAST 12 MONTHS, WAS THERE A TIME WHEN YOU DID NOT HAVE A STEADY PLACE TO SLEEP OR SLEPT IN A SHELTER (INCLUDING NOW)?: NO

## 2023-05-22 SDOH — ECONOMIC STABILITY: INCOME INSECURITY: HOW HARD IS IT FOR YOU TO PAY FOR THE VERY BASICS LIKE FOOD, HOUSING, MEDICAL CARE, AND HEATING?: NOT VERY HARD

## 2023-05-22 SDOH — ECONOMIC STABILITY: TRANSPORTATION INSECURITY
IN THE PAST 12 MONTHS, HAS LACK OF TRANSPORTATION KEPT YOU FROM MEETINGS, WORK, OR FROM GETTING THINGS NEEDED FOR DAILY LIVING?: NO

## 2023-05-22 SDOH — ECONOMIC STABILITY: FOOD INSECURITY: WITHIN THE PAST 12 MONTHS, THE FOOD YOU BOUGHT JUST DIDN'T LAST AND YOU DIDN'T HAVE MONEY TO GET MORE.: NEVER TRUE

## 2023-05-23 ENCOUNTER — OFFICE VISIT (OUTPATIENT)
Dept: PRIMARY CARE CLINIC | Age: 63
End: 2023-05-23
Payer: COMMERCIAL

## 2023-05-23 VITALS
OXYGEN SATURATION: 96 % | HEART RATE: 79 BPM | DIASTOLIC BLOOD PRESSURE: 70 MMHG | TEMPERATURE: 97.1 F | BODY MASS INDEX: 36.64 KG/M2 | SYSTOLIC BLOOD PRESSURE: 128 MMHG | WEIGHT: 220.2 LBS

## 2023-05-23 DIAGNOSIS — L30.0 NUMMULAR ECZEMA: ICD-10-CM

## 2023-05-23 DIAGNOSIS — N18.1 TYPE 2 DIABETES MELLITUS WITH STAGE 1 CHRONIC KIDNEY DISEASE, WITH LONG-TERM CURRENT USE OF INSULIN (HCC): Primary | ICD-10-CM

## 2023-05-23 DIAGNOSIS — E11.22 TYPE 2 DIABETES MELLITUS WITH STAGE 1 CHRONIC KIDNEY DISEASE, WITH LONG-TERM CURRENT USE OF INSULIN (HCC): Primary | ICD-10-CM

## 2023-05-23 DIAGNOSIS — R53.83 FATIGUE, UNSPECIFIED TYPE: ICD-10-CM

## 2023-05-23 DIAGNOSIS — L23.7 POISON IVY DERMATITIS: ICD-10-CM

## 2023-05-23 DIAGNOSIS — Z79.4 TYPE 2 DIABETES MELLITUS WITH STAGE 1 CHRONIC KIDNEY DISEASE, WITH LONG-TERM CURRENT USE OF INSULIN (HCC): Primary | ICD-10-CM

## 2023-05-23 PROCEDURE — 3074F SYST BP LT 130 MM HG: CPT | Performed by: STUDENT IN AN ORGANIZED HEALTH CARE EDUCATION/TRAINING PROGRAM

## 2023-05-23 PROCEDURE — 3078F DIAST BP <80 MM HG: CPT | Performed by: STUDENT IN AN ORGANIZED HEALTH CARE EDUCATION/TRAINING PROGRAM

## 2023-05-23 PROCEDURE — 99214 OFFICE O/P EST MOD 30 MIN: CPT | Performed by: STUDENT IN AN ORGANIZED HEALTH CARE EDUCATION/TRAINING PROGRAM

## 2023-05-23 RX ORDER — BLOOD SUGAR DIAGNOSTIC
STRIP MISCELLANEOUS
COMMUNITY
Start: 2023-04-21

## 2023-05-23 RX ORDER — CLOBETASOL PROPIONATE 0.5 MG/G
OINTMENT TOPICAL
Qty: 30 G | Refills: 0 | Status: SHIPPED | OUTPATIENT
Start: 2023-05-23

## 2023-05-23 RX ORDER — INSULIN DEGLUDEC INJECTION 100 U/ML
24 INJECTION, SOLUTION SUBCUTANEOUS NIGHTLY
COMMUNITY

## 2023-05-23 RX ORDER — SEMAGLUTIDE 1.34 MG/ML
INJECTION, SOLUTION SUBCUTANEOUS
COMMUNITY
Start: 2023-04-26

## 2023-05-23 ASSESSMENT — PATIENT HEALTH QUESTIONNAIRE - PHQ9
SUM OF ALL RESPONSES TO PHQ QUESTIONS 1-9: 1
SUM OF ALL RESPONSES TO PHQ9 QUESTIONS 1 & 2: 1
SUM OF ALL RESPONSES TO PHQ QUESTIONS 1-9: 1
2. FEELING DOWN, DEPRESSED OR HOPELESS: 0
SUM OF ALL RESPONSES TO PHQ QUESTIONS 1-9: 1
SUM OF ALL RESPONSES TO PHQ QUESTIONS 1-9: 1
1. LITTLE INTEREST OR PLEASURE IN DOING THINGS: 1

## 2023-05-23 ASSESSMENT — ENCOUNTER SYMPTOMS
SHORTNESS OF BREATH: 0
NAUSEA: 0
VOMITING: 0
DIARRHEA: 0

## 2023-05-23 NOTE — ASSESSMENT & PLAN NOTE
Chronic, uncontrolled with A1c 10.2  Follows with endo Dr. Cutler Ok 24 units nightly, Ozempic 1 mg weekly, Farxiga 10 mg daily  Foot exam normal today  Retinal exam due next year

## 2023-05-23 NOTE — PROGRESS NOTES
11/2022 and 3/2023 lab results received from ENDO, placed in Sarahs box for provider review/to scan.

## 2023-05-23 NOTE — PROGRESS NOTES
ESTABLISHED PRIMARY CARE VISIT    23  Name: Travis Kent   : 1960   Age: 65 y.o. Sex: female        Assessment & Plan:     Problem List Items Addressed This Visit          Endocrine    Type 2 diabetes mellitus with stage 1 chronic kidney disease, with long-term current use of insulin ([facility] [address] - Primary     Chronic, uncontrolled with A1c 10.2  Follows with endo Dr. Nazario Gage 24 units nightly, Ozempic 1 mg weekly, Farxiga 10 mg daily  Foot exam normal today  Retinal exam due next year         Relevant Medications    OZEMPIC, 1 MG/DOSE, 4 MG/3ML SOPN    Insulin Degludec (TRESIBA FLEXTOUCH) 100 UNIT/ML SOPN    Other Relevant Orders     DIABETES FOOT EXAM (Completed)     Other Visit Diagnoses       Fatigue, unspecified type        Recent normal labs with endo except A1c uncontrolled  Declined additional labs today  Suspect seasonal affective d/o  Wants to work on routine and sleep    Poison ivy dermatitis        Relevant Medications    clobetasol (TEMOVATE) 0.05 % ointment    Nummular eczema        Relevant Medications    clobetasol (TEMOVATE) 0.05 % ointment          Counseled patient regarding above diagnosis, including possible risks and complications, especially if left uncontrolled. Counseled patient as appropriate and relevant regarding any possible side effects, risks, and alternatives to treatment; the patient verbalizes understanding, and is in agreement with the plan as detailed above. All educational materials and instructions were discussed and included on the After Visit Summary. All questions answered to the patient's satisfaction. The patient was advised to call or send [employer] message for any concerns prior to next appointment. Return in about 6 months (around 2023).     Subjective:     Chief Complaint   Patient presents with    Follow-up     Very tired for the last month   Due for diabetic foot exam      Patient returns for follow-up  A1c 10.2 last

## 2023-07-10 ENCOUNTER — TELEPHONE (OUTPATIENT)
Dept: PRIMARY CARE CLINIC | Age: 63
End: 2023-07-10

## 2023-07-10 ENCOUNTER — OFFICE VISIT (OUTPATIENT)
Dept: PRIMARY CARE CLINIC | Age: 63
End: 2023-07-10
Payer: COMMERCIAL

## 2023-07-10 VITALS
WEIGHT: 221.8 LBS | SYSTOLIC BLOOD PRESSURE: 136 MMHG | HEIGHT: 65 IN | HEART RATE: 78 BPM | BODY MASS INDEX: 36.96 KG/M2 | DIASTOLIC BLOOD PRESSURE: 86 MMHG | TEMPERATURE: 97.4 F | OXYGEN SATURATION: 97 %

## 2023-07-10 DIAGNOSIS — L02.214 ABSCESS OF LEFT GROIN: Primary | ICD-10-CM

## 2023-07-10 PROCEDURE — 3075F SYST BP GE 130 - 139MM HG: CPT

## 2023-07-10 PROCEDURE — 99213 OFFICE O/P EST LOW 20 MIN: CPT

## 2023-07-10 PROCEDURE — 3079F DIAST BP 80-89 MM HG: CPT

## 2023-07-10 RX ORDER — NYSTATIN 100000 U/G
CREAM TOPICAL
Qty: 15 G | Refills: 0 | Status: SHIPPED | OUTPATIENT
Start: 2023-07-10

## 2023-07-10 RX ORDER — CEFDINIR 300 MG/1
300 CAPSULE ORAL 2 TIMES DAILY
Qty: 20 CAPSULE | Refills: 0 | Status: SHIPPED | OUTPATIENT
Start: 2023-07-10 | End: 2023-07-20

## 2023-07-10 ASSESSMENT — ENCOUNTER SYMPTOMS
APNEA: 0
VOMITING: 0
ABDOMINAL PAIN: 0

## 2023-07-10 NOTE — PROGRESS NOTES
Chief Complaint:   Sore (Has sore on left hip area more towards the groin )      History of Present Illness   HPI:  Phyllis Whelan is a 65 y.o. female who presents to walk-in today left groin wounds. She has been trying to self treat at home. She has had this before in the right groin and was giving an antibiotic that helped. She denies any fevers, chills, chest pain, or shortness of breath. Prior to Visit Medications    Medication Sig Taking? Authorizing Provider   OZEMPIC, 1 MG/DOSE, 4 MG/3ML SOPN  Yes Historical Provider, MD   Insulin Degludec (TRESIBA FLEXTOUCH) 100 UNIT/ML SOPN Inject 24 Units into the skin nightly Yes Historical Provider, MD   Nargis [place] strip  Yes Historical Provider, MD   collagenase 250 UNIT/GM ointment Santyl 250 unit/gram topical ointment   apply thin layer to affected area once daily Yes Historical Provider, MD   Multiple Vitamins-Minerals (CENTRUM SILVER PO) Take by mouth Yes Historical Provider, MD   clobetasol (TEMOVATE) 0.05 % ointment Apply topically 2 times daily. Yes Nancy Deutsch MD   metoprolol tartrate (LOPRESSOR) 50 MG tablet Take 1 tablet by mouth 2 times daily Yes Nancy Deutsch MD   nystatin (MYCOSTATIN) 622764 UNIT/GM powder Apply topically 3 times daily as needed (abdominal folds/ breast folds) Yes Nancy Deutsch MD   ibuprofen (ADVIL;MOTRIN) 600 MG tablet ibuprofen 600 mg tablet   TAKE 1 TABLET BY MOUTH EVERY 8 HOURS AS NEEDED FOR PAIN Yes Historical Provider, MD   vitamin D (CHOLECALCIFEROL) 25 MCG (1000 UT) TABS tablet Take 2 tablets by mouth daily Yes Historical Provider, MD   Cyanocobalamin (B-12) 2500 MCG TABS Take 2,500 mcg by mouth daily Yes Historical Provider, MD   Biotin 1 MG CAPS Take 1 capsule by mouth daily Yes Historical Provider, MD   clotrimazole-betamethasone (LOTRISONE) 1-0.05 % cream Apply topically 2 times daily. Patient taking differently: Apply topically 2 times daily as needed.  Yes Floresita Sam MD   FARXIGA 10 MG

## 2023-07-10 NOTE — TELEPHONE ENCOUNTER
Patient calling to see if you can send her in a prescription for her. About a year ago she had a sore in her right side and she is having the same issue on her groin area on the left side.     Tried to call back to clarify with patient because if she was seen here for previous sore it states \"left side groin area\"  and to advise her to come through walk in but I left vm for her to call back

## 2023-07-10 NOTE — TELEPHONE ENCOUNTER
Patient states its the exact same thing you say her for on 9/20/22 and you prescribed her an antibiotic and also wondering if she can use clobetasol to help with that. Patient doesn't want to be seen through walk in clinic.

## 2023-08-03 LAB
ALBUMIN SERPL-MCNC: 3.7 G/DL
ALP BLD-CCNC: NORMAL U/L
ALT SERPL-CCNC: NORMAL U/L
ANION GAP SERPL CALCULATED.3IONS-SCNC: NORMAL MMOL/L
AST SERPL-CCNC: NORMAL U/L
AVERAGE GLUCOSE: NORMAL
BILIRUB SERPL-MCNC: NORMAL MG/DL
BUN BLDV-MCNC: NORMAL MG/DL
CALCIUM SERPL-MCNC: NORMAL MG/DL
CHLORIDE BLD-SCNC: NORMAL MMOL/L
CHOLESTEROL, TOTAL: 219 MG/DL
CHOLESTEROL/HDL RATIO: NORMAL
CO2: NORMAL
CREAT SERPL-MCNC: 0.57 MG/DL
EGFR: 103
GLUCOSE BLD-MCNC: NORMAL MG/DL
HBA1C MFR BLD: 7.8 %
HDLC SERPL-MCNC: NORMAL MG/DL
LDL CHOLESTEROL CALCULATED: NORMAL
LDL CHOLESTEROL DIRECT: NORMAL
POTASSIUM SERPL-SCNC: NORMAL MMOL/L
PTH INTACT: NORMAL
SODIUM BLD-SCNC: NORMAL MMOL/L
TOTAL PROTEIN: NORMAL
TRIGL SERPL-MCNC: NORMAL MG/DL
VLDLC SERPL CALC-MCNC: NORMAL MG/DL

## 2023-08-04 ENCOUNTER — TELEPHONE (OUTPATIENT)
Dept: PRIMARY CARE CLINIC | Age: 63
End: 2023-08-04

## 2023-08-04 NOTE — TELEPHONE ENCOUNTER
Patient called and states her endocrinologist is retiring and she recently had blood work done by them. She would like to make an appointment to discuss everything with you, with her blood work and medications.

## 2023-08-07 NOTE — TELEPHONE ENCOUNTER
Patient called back, states she will be in [place] all next week. Please advise again. Dr. Aguila Lazaro,   You have to openings on hold tomorrow,a 10a& 3p.

## 2023-08-07 NOTE — TELEPHONE ENCOUNTER
Tried to call patient to schedule, no answer, recording came on with patients voice, but option to leave voicemail not available, phone states can't take your call now. Will try again soon.

## 2023-08-08 ENCOUNTER — OFFICE VISIT (OUTPATIENT)
Dept: PRIMARY CARE CLINIC | Age: 63
End: 2023-08-08
Payer: COMMERCIAL

## 2023-08-08 VITALS
HEART RATE: 94 BPM | BODY MASS INDEX: 37.49 KG/M2 | HEIGHT: 65 IN | DIASTOLIC BLOOD PRESSURE: 70 MMHG | WEIGHT: 225 LBS | OXYGEN SATURATION: 98 % | SYSTOLIC BLOOD PRESSURE: 118 MMHG | TEMPERATURE: 97.2 F

## 2023-08-08 DIAGNOSIS — E03.9 HYPOTHYROIDISM, UNSPECIFIED TYPE: ICD-10-CM

## 2023-08-08 DIAGNOSIS — Z79.4 TYPE 2 DIABETES MELLITUS WITH STAGE 1 CHRONIC KIDNEY DISEASE, WITH LONG-TERM CURRENT USE OF INSULIN (HCC): Primary | ICD-10-CM

## 2023-08-08 DIAGNOSIS — E11.22 TYPE 2 DIABETES MELLITUS WITH STAGE 1 CHRONIC KIDNEY DISEASE, WITH LONG-TERM CURRENT USE OF INSULIN (HCC): Primary | ICD-10-CM

## 2023-08-08 DIAGNOSIS — N18.1 TYPE 2 DIABETES MELLITUS WITH STAGE 1 CHRONIC KIDNEY DISEASE, WITH LONG-TERM CURRENT USE OF INSULIN (HCC): Primary | ICD-10-CM

## 2023-08-08 DIAGNOSIS — E66.01 CLASS 2 SEVERE OBESITY WITH SERIOUS COMORBIDITY AND BODY MASS INDEX (BMI) OF 37.0 TO 37.9 IN ADULT, UNSPECIFIED OBESITY TYPE (HCC): ICD-10-CM

## 2023-08-08 DIAGNOSIS — E65 ABDOMINAL PANNICULUS, SYMPTOMATIC: ICD-10-CM

## 2023-08-08 PROBLEM — E66.812 CLASS 2 SEVERE OBESITY WITH SERIOUS COMORBIDITY AND BODY MASS INDEX (BMI) OF 37.0 TO 37.9 IN ADULT: Status: ACTIVE | Noted: 2023-08-08

## 2023-08-08 PROCEDURE — 99214 OFFICE O/P EST MOD 30 MIN: CPT | Performed by: STUDENT IN AN ORGANIZED HEALTH CARE EDUCATION/TRAINING PROGRAM

## 2023-08-08 PROCEDURE — 3074F SYST BP LT 130 MM HG: CPT | Performed by: STUDENT IN AN ORGANIZED HEALTH CARE EDUCATION/TRAINING PROGRAM

## 2023-08-08 PROCEDURE — 3078F DIAST BP <80 MM HG: CPT | Performed by: STUDENT IN AN ORGANIZED HEALTH CARE EDUCATION/TRAINING PROGRAM

## 2023-08-08 PROCEDURE — 3046F HEMOGLOBIN A1C LEVEL >9.0%: CPT | Performed by: STUDENT IN AN ORGANIZED HEALTH CARE EDUCATION/TRAINING PROGRAM

## 2023-08-08 RX ORDER — VITAMIN B COMPLEX
TABLET ORAL
COMMUNITY

## 2023-08-08 RX ORDER — SEMAGLUTIDE 2.68 MG/ML
2 INJECTION, SOLUTION SUBCUTANEOUS WEEKLY
Qty: 9 ML | Refills: 1 | Status: SHIPPED
Start: 2023-08-08 | End: 2023-08-11 | Stop reason: ALTCHOICE

## 2023-08-08 RX ORDER — NYSTATIN 100000 [USP'U]/G
POWDER TOPICAL
Qty: 60 G | Refills: 1 | Status: SHIPPED | OUTPATIENT
Start: 2023-08-08

## 2023-08-08 RX ORDER — AMPICILLIN TRIHYDRATE 250 MG
CAPSULE ORAL
COMMUNITY

## 2023-08-08 ASSESSMENT — ENCOUNTER SYMPTOMS
COUGH: 0
ABDOMINAL PAIN: 0
DIARRHEA: 0
CONSTIPATION: 0
SHORTNESS OF BREATH: 0
NAUSEA: 1

## 2023-08-08 NOTE — ASSESSMENT & PLAN NOTE
Chronic, improved with A1c 7.8  Labs reviewed from Endo  Endo will be retiring  Will assume care of her diabetes  Patient concerned regarding weight gain from [place]  Increase Ozempic 2 mg weekly  Reduce Tresiba by 2 units weekly while maintaining blood sugar goals  If hyperglycemia, patient to stop taper  Continue Farxiga 10 mg daily

## 2023-08-08 NOTE — ASSESSMENT & PLAN NOTE
Chronic, controlled  Labs reviewed from Endo  Endo will be retiring  Will assume care of her hypothyroidism  Continue Synthroid 25 mg M-F, 50 mg Sa-Jacobson

## 2023-08-10 ENCOUNTER — TELEPHONE (OUTPATIENT)
Dept: PRIMARY CARE CLINIC | Age: 63
End: 2023-08-10

## 2023-08-10 DIAGNOSIS — N18.1 TYPE 2 DIABETES MELLITUS WITH STAGE 1 CHRONIC KIDNEY DISEASE, WITH LONG-TERM CURRENT USE OF INSULIN (HCC): Primary | ICD-10-CM

## 2023-08-10 DIAGNOSIS — Z79.4 TYPE 2 DIABETES MELLITUS WITH STAGE 1 CHRONIC KIDNEY DISEASE, WITH LONG-TERM CURRENT USE OF INSULIN (HCC): Primary | ICD-10-CM

## 2023-08-10 DIAGNOSIS — E11.22 TYPE 2 DIABETES MELLITUS WITH STAGE 1 CHRONIC KIDNEY DISEASE, WITH LONG-TERM CURRENT USE OF INSULIN (HCC): Primary | ICD-10-CM

## 2023-08-10 DIAGNOSIS — E66.01 CLASS 2 SEVERE OBESITY WITH SERIOUS COMORBIDITY AND BODY MASS INDEX (BMI) OF 37.0 TO 37.9 IN ADULT, UNSPECIFIED OBESITY TYPE (HCC): ICD-10-CM

## 2023-08-10 NOTE — TELEPHONE ENCOUNTER
Patient states you sent the increased dose of ozempic to her pharmacy but that dose is unavailable and they suggested maybe switching medications.  Patient is wondering if she could try [facility]

## 2023-08-11 RX ORDER — TIRZEPATIDE 7.5 MG/.5ML
7.5 INJECTION, SOLUTION SUBCUTANEOUS WEEKLY
Qty: 6 ML | Refills: 0 | Status: SHIPPED | OUTPATIENT
Start: 2023-08-11

## 2023-08-11 RX ORDER — TIRZEPATIDE 7.5 MG/.5ML
7.5 INJECTION, SOLUTION SUBCUTANEOUS WEEKLY
Qty: 6 ML | Refills: 0 | Status: SHIPPED
Start: 2023-08-11 | End: 2023-08-11

## 2023-08-11 NOTE — TELEPHONE ENCOUNTER
Called patient to discuss transition. Discussed that [place] is slightly different from [address] [place] [address] and may have different side effects. Given she was just previously on Ozempic 1 mg with plans to increase to the 2 mg dose, will use 1 mg dose for conversion to [place] and start at 7.5 mg weekly. Sent to local pharmacy per request.  Patient will call if any side effects. Her next dose is next Wednesday while she is out of town. Advised to call if blood sugars uncontrolled or not seeing adequate weight loss 1 to 2 pounds weekly on this dose, can increase in 4 weeks.

## 2023-08-17 ENCOUNTER — TELEPHONE (OUTPATIENT)
Dept: PRIMARY CARE CLINIC | Age: 63
End: 2023-08-17

## 2023-08-17 DIAGNOSIS — E11.22 TYPE 2 DIABETES MELLITUS WITH STAGE 1 CHRONIC KIDNEY DISEASE, WITH LONG-TERM CURRENT USE OF INSULIN (HCC): Primary | ICD-10-CM

## 2023-08-17 DIAGNOSIS — N18.1 TYPE 2 DIABETES MELLITUS WITH STAGE 1 CHRONIC KIDNEY DISEASE, WITH LONG-TERM CURRENT USE OF INSULIN (HCC): Primary | ICD-10-CM

## 2023-08-17 DIAGNOSIS — Z79.4 TYPE 2 DIABETES MELLITUS WITH STAGE 1 CHRONIC KIDNEY DISEASE, WITH LONG-TERM CURRENT USE OF INSULIN (HCC): Primary | ICD-10-CM

## 2023-08-17 DIAGNOSIS — B37.2 INTERTRIGINOUS CANDIDIASIS: ICD-10-CM

## 2023-08-17 NOTE — TELEPHONE ENCOUNTER
Patient called and is wondering if you could send her in something for sores. She has had these sores before and right now she has two of them.    She states if able to you can [facility] to [facility] on [address] [facility] but tried to call patient to verify pharmacy message was a little distorted but had to leave VM

## 2023-08-21 NOTE — TELEPHONE ENCOUNTER
[facility]. Patient states after she started the Toujeo she noticed the sores but its still continuing after starting the [place]. She doesn't know if this is why she is getting them?

## 2023-08-23 RX ORDER — KETOCONAZOLE 20 MG/G
CREAM TOPICAL
Qty: 60 G | Refills: 0 | Status: SHIPPED | OUTPATIENT
Start: 2023-08-23

## 2023-08-23 NOTE — TELEPHONE ENCOUNTER
Patient reports sores are underneath breasts. Similar to when she had underneath belly before. Not consistent with lipohypertrophy or post-injection inflammation as the sores are not where she injects her insulin. Suspect yeast intertriginous dermatitis. Patient has been using topical nystatin. Would recommend a stronger treatment with topical ketoconazole. Once sores resolved, restart nystatin powder. If no improvement with ketoconazole, recommend evaluation in walk-in to rule out superimposed bacterial infection.   If patient continues to have this issue, may need dermatology evaluation to rule out other skin conditions

## 2023-09-12 DIAGNOSIS — E65 ABDOMINAL PANNICULUS, SYMPTOMATIC: ICD-10-CM

## 2023-09-12 DIAGNOSIS — I10 ESSENTIAL HYPERTENSION: ICD-10-CM

## 2023-09-12 RX ORDER — NYSTATIN 100000 [USP'U]/G
POWDER TOPICAL
Qty: 60 G | Refills: 1 | Status: SHIPPED | OUTPATIENT
Start: 2023-09-12

## 2023-09-12 RX ORDER — METOPROLOL TARTRATE 50 MG/1
50 TABLET, FILM COATED ORAL 2 TIMES DAILY
Qty: 180 TABLET | Refills: 1 | Status: SHIPPED | OUTPATIENT
Start: 2023-09-12

## 2023-09-12 NOTE — TELEPHONE ENCOUNTER
Patients last appointment 8/8/2023.   Patients next scheduled appointment   Future Appointments   Date Time Provider [address]   11/28/2023  9:30 AM Meagan Candelaria MD [facility]

## 2023-09-19 ENCOUNTER — TELEPHONE (OUTPATIENT)
Dept: PRIMARY CARE CLINIC | Age: 63
End: 2023-09-19

## 2023-09-19 DIAGNOSIS — E65 ABDOMINAL PANNICULUS, SYMPTOMATIC: Primary | ICD-10-CM

## 2023-09-19 DIAGNOSIS — B37.2 INTERTRIGINOUS CANDIDIASIS: ICD-10-CM

## 2023-09-19 NOTE — TELEPHONE ENCOUNTER
----- Message from Emilia Linda sent at 9/19/2023  8:29 AM EDT -----  Subject: Appointment Request    Reason for Call: Established Patient Appointment needed: Semi-Routine Skin   Problem    QUESTIONS    Reason for appointment request? No appointments available during search     Additional Information for Provider? Patient is having skin Ulcers and she   stated she is tired of dealing with this issue.  Please contact patient.  ---------------------------------------------------------------------------  --------------  Reyna ESPAÑA  [phone]; OK to leave message on voicemail  ---------------------------------------------------------------------------  --------------  SCRIPT ANSWERS

## 2023-09-19 NOTE — TELEPHONE ENCOUNTER
Patient has sores right now has two in her armpit has had them on breast and stomach. It starts out has blister size of fingernail, then starts to look infected. She states this has been going on for at least a month and she has seen Dr Watson Turner before. She is wondering if she needs to see dermatology at this point.      She would like a referral to [facility] in [place]

## 2023-09-20 DIAGNOSIS — E66.01 CLASS 2 SEVERE OBESITY WITH SERIOUS COMORBIDITY AND BODY MASS INDEX (BMI) OF 37.0 TO 37.9 IN ADULT, UNSPECIFIED OBESITY TYPE (HCC): ICD-10-CM

## 2023-09-20 DIAGNOSIS — N18.1 TYPE 2 DIABETES MELLITUS WITH STAGE 1 CHRONIC KIDNEY DISEASE, WITH LONG-TERM CURRENT USE OF INSULIN (HCC): ICD-10-CM

## 2023-09-20 DIAGNOSIS — E11.22 TYPE 2 DIABETES MELLITUS WITH STAGE 1 CHRONIC KIDNEY DISEASE, WITH LONG-TERM CURRENT USE OF INSULIN (HCC): ICD-10-CM

## 2023-09-20 DIAGNOSIS — Z79.4 TYPE 2 DIABETES MELLITUS WITH STAGE 1 CHRONIC KIDNEY DISEASE, WITH LONG-TERM CURRENT USE OF INSULIN (HCC): ICD-10-CM

## 2023-09-20 RX ORDER — TIRZEPATIDE 7.5 MG/.5ML
7.5 INJECTION, SOLUTION SUBCUTANEOUS WEEKLY
Qty: 6 ML | Refills: 0 | Status: SHIPPED | OUTPATIENT
Start: 2023-09-20

## 2023-09-20 NOTE — TELEPHONE ENCOUNTER
Patients last appointment 8/8/2023.   Patients next scheduled appointment   Future Appointments   Date Time Provider [address]  [address]   11/28/2023  9:30 AM Meagan Lacey MD [facility]

## 2023-10-04 ENCOUNTER — OFFICE VISIT (OUTPATIENT)
Dept: PRIMARY CARE CLINIC | Age: 63
End: 2023-10-04
Payer: COMMERCIAL

## 2023-10-04 ENCOUNTER — TELEPHONE (OUTPATIENT)
Dept: PRIMARY CARE CLINIC | Age: 63
End: 2023-10-04

## 2023-10-04 VITALS
BODY MASS INDEX: 37.29 KG/M2 | HEIGHT: 65 IN | WEIGHT: 223.8 LBS | DIASTOLIC BLOOD PRESSURE: 70 MMHG | RESPIRATION RATE: 16 BRPM | TEMPERATURE: 97.7 F | HEART RATE: 89 BPM | OXYGEN SATURATION: 98 % | SYSTOLIC BLOOD PRESSURE: 138 MMHG

## 2023-10-04 DIAGNOSIS — L02.91 ABSCESS: Primary | ICD-10-CM

## 2023-10-04 PROCEDURE — 99213 OFFICE O/P EST LOW 20 MIN: CPT

## 2023-10-04 PROCEDURE — 3075F SYST BP GE 130 - 139MM HG: CPT

## 2023-10-04 PROCEDURE — 3078F DIAST BP <80 MM HG: CPT

## 2023-10-04 RX ORDER — DOXYCYCLINE HYCLATE 100 MG/1
100 CAPSULE ORAL 2 TIMES DAILY
Qty: 14 CAPSULE | Refills: 0 | Status: SHIPPED | OUTPATIENT
Start: 2023-10-04 | End: 2023-10-11

## 2023-10-04 ASSESSMENT — ENCOUNTER SYMPTOMS
NAUSEA: 0
SHORTNESS OF BREATH: 0
ABDOMINAL PAIN: 0
VOMITING: 0
EYE DISCHARGE: 0
SORE THROAT: 0
DIARRHEA: 0
COUGH: 0
BACK PAIN: 0
EYE PAIN: 0
COLOR CHANGE: 0
SINUS PRESSURE: 0
RHINORRHEA: 0
SINUS PAIN: 0

## 2023-10-04 NOTE — TELEPHONE ENCOUNTER
Patient called and left  stating that she has another boil or knot in her groin area. She states Sunday it was the size of an eraser and now its the size of 1/2 an acorn.  She is wondering if you can call something in again to [address] [facility]

## 2023-10-04 NOTE — PROGRESS NOTES
10/4/23  Aparna Desai : 1960 Sex: female  Age: 59 y.o. Chief Complaint   Patient presents with    Other     C/o painful and enlarging boil on left groin/labia area  clear to yellow drg  tried hot compresses with minimal relief       Patient presents for a boil on her left groin area that has been increasing. She reports she had it in the past and required antibiotics. She reports a little drainage, but none currently. Review of Systems   Constitutional:  Negative for activity change, appetite change, chills, fatigue and fever. HENT:  Negative for ear pain, rhinorrhea, sinus pressure, sinus pain and sore throat. Eyes:  Negative for pain and discharge. Respiratory:  Negative for cough and shortness of breath. Gastrointestinal:  Negative for abdominal pain, diarrhea, nausea and vomiting. Genitourinary:  Negative for dysuria, frequency and urgency. Musculoskeletal:  Negative for back pain, gait problem, myalgias and neck pain. Skin:  Positive for wound. Negative for color change and rash. Neurological:  Negative for dizziness, weakness and headaches. Psychiatric/Behavioral:  Negative for agitation, behavioral problems and confusion. Current Outpatient Medications:     doxycycline hyclate (VIBRAMYCIN) 100 MG capsule, Take 1 capsule by mouth 2 times daily for 7 days, Disp: 14 capsule, Rfl: 0    Tirzepatide (MOUNJARO) 7.5 MG/0.5ML SOPN SC injection, Inject 0.5 mLs into the skin once a week, Disp: 6 mL, Rfl: 0    metoprolol tartrate (LOPRESSOR) 50 MG tablet, Take 1 tablet by mouth 2 times daily, Disp: 180 tablet, Rfl: 1    nystatin (MYCOSTATIN) 176329 UNIT/GM powder, Apply 3 times daily as needed to skin folds. , Disp: 60 g, Rfl: 1    ketoconazole (NIZORAL) 2 % cream, Apply topically daily. , Disp: 60 g, Rfl: 0    Cinnamon 500 MG CAPS, Take by mouth, Disp: , Rfl:     Coenzyme Q10 (COQ10) 100 MG CAPS, Take by mouth, Disp: , Rfl:     Insulin Degludec (Barbarann Nissen)

## 2023-11-14 ENCOUNTER — TELEPHONE (OUTPATIENT)
Dept: PRIMARY CARE CLINIC | Age: 63
End: 2023-11-14

## 2023-11-14 NOTE — TELEPHONE ENCOUNTER
Patient called in asking if you could send an antibiotic over the phone for her. She has had a sinus infection and covid going on in their house. She's had scratchy throat cough and sinus drainage. Advised patient she would need to come through express as Dr Eduardo Mcknight would not be able to send antibiotic over the phone. She verbalized understanding and will come sometime later.

## 2023-11-15 ENCOUNTER — OFFICE VISIT (OUTPATIENT)
Dept: PRIMARY CARE CLINIC | Age: 63
End: 2023-11-15
Payer: COMMERCIAL

## 2023-11-15 VITALS
DIASTOLIC BLOOD PRESSURE: 80 MMHG | OXYGEN SATURATION: 98 % | HEART RATE: 84 BPM | WEIGHT: 222 LBS | RESPIRATION RATE: 18 BRPM | BODY MASS INDEX: 36.94 KG/M2 | TEMPERATURE: 97.5 F | SYSTOLIC BLOOD PRESSURE: 120 MMHG

## 2023-11-15 DIAGNOSIS — R05.1 ACUTE COUGH: ICD-10-CM

## 2023-11-15 DIAGNOSIS — R09.81 NASAL CONGESTION: ICD-10-CM

## 2023-11-15 DIAGNOSIS — J01.40 ACUTE NON-RECURRENT PANSINUSITIS: Primary | ICD-10-CM

## 2023-11-15 PROCEDURE — 3079F DIAST BP 80-89 MM HG: CPT

## 2023-11-15 PROCEDURE — 3074F SYST BP LT 130 MM HG: CPT

## 2023-11-15 PROCEDURE — 99213 OFFICE O/P EST LOW 20 MIN: CPT

## 2023-11-15 RX ORDER — BENZONATATE 100 MG/1
100 CAPSULE ORAL 3 TIMES DAILY PRN
Qty: 30 CAPSULE | Refills: 0 | Status: SHIPPED | OUTPATIENT
Start: 2023-11-15 | End: 2023-11-25

## 2023-11-15 RX ORDER — GUAIFENESIN 600 MG/1
600 TABLET, EXTENDED RELEASE ORAL 2 TIMES DAILY
Qty: 20 TABLET | Refills: 0 | Status: SHIPPED | OUTPATIENT
Start: 2023-11-15 | End: 2023-11-25

## 2023-11-15 RX ORDER — AZITHROMYCIN 250 MG/1
250 TABLET, FILM COATED ORAL SEE ADMIN INSTRUCTIONS
Qty: 6 TABLET | Refills: 0 | Status: SHIPPED | OUTPATIENT
Start: 2023-11-15 | End: 2023-11-20

## 2023-11-15 ASSESSMENT — ENCOUNTER SYMPTOMS
SORE THROAT: 1
SINUS PAIN: 0
BACK PAIN: 0
EYE PAIN: 0
DIARRHEA: 0
ABDOMINAL PAIN: 0
EYE DISCHARGE: 0
SINUS PRESSURE: 1
VOMITING: 0
SHORTNESS OF BREATH: 0
NAUSEA: 0
COUGH: 1
COLOR CHANGE: 0
RHINORRHEA: 1

## 2023-11-15 NOTE — PROGRESS NOTES
Patient to call dermatology and f/u with them as patient reports rash is not getting better. The patient is to call for any concerns or return if any of the signs or symptoms worsen. The patient is to follow-up with PCP in the next 2-3 days for repeat evaluation repeat assessment or go directly to the emergency department. No follow-ups on file.       Seen By:      JARRET Strauss CNP

## 2023-11-28 ENCOUNTER — OFFICE VISIT (OUTPATIENT)
Dept: PRIMARY CARE CLINIC | Age: 63
End: 2023-11-28
Payer: COMMERCIAL

## 2023-11-28 VITALS
HEART RATE: 76 BPM | HEIGHT: 65 IN | OXYGEN SATURATION: 100 % | DIASTOLIC BLOOD PRESSURE: 86 MMHG | BODY MASS INDEX: 36.69 KG/M2 | WEIGHT: 220.2 LBS | TEMPERATURE: 97.3 F | SYSTOLIC BLOOD PRESSURE: 126 MMHG

## 2023-11-28 DIAGNOSIS — E66.01 CLASS 2 SEVERE OBESITY WITH SERIOUS COMORBIDITY AND BODY MASS INDEX (BMI) OF 36.0 TO 36.9 IN ADULT, UNSPECIFIED OBESITY TYPE (HCC): ICD-10-CM

## 2023-11-28 DIAGNOSIS — I10 ESSENTIAL HYPERTENSION: ICD-10-CM

## 2023-11-28 DIAGNOSIS — E11.9 TYPE 2 DIABETES MELLITUS WITHOUT COMPLICATION, WITHOUT LONG-TERM CURRENT USE OF INSULIN (HCC): Primary | ICD-10-CM

## 2023-11-28 DIAGNOSIS — E03.9 HYPOTHYROIDISM, UNSPECIFIED TYPE: ICD-10-CM

## 2023-11-28 PROCEDURE — 3074F SYST BP LT 130 MM HG: CPT | Performed by: STUDENT IN AN ORGANIZED HEALTH CARE EDUCATION/TRAINING PROGRAM

## 2023-11-28 PROCEDURE — 3078F DIAST BP <80 MM HG: CPT | Performed by: STUDENT IN AN ORGANIZED HEALTH CARE EDUCATION/TRAINING PROGRAM

## 2023-11-28 PROCEDURE — 99214 OFFICE O/P EST MOD 30 MIN: CPT | Performed by: STUDENT IN AN ORGANIZED HEALTH CARE EDUCATION/TRAINING PROGRAM

## 2023-11-28 PROCEDURE — 3051F HG A1C>EQUAL 7.0%<8.0%: CPT | Performed by: STUDENT IN AN ORGANIZED HEALTH CARE EDUCATION/TRAINING PROGRAM

## 2023-11-28 RX ORDER — DAPAGLIFLOZIN 10 MG/1
10 TABLET, FILM COATED ORAL DAILY
Qty: 90 TABLET | Refills: 1 | Status: SHIPPED
Start: 2023-11-28 | End: 2023-11-28

## 2023-11-28 RX ORDER — METOPROLOL TARTRATE 50 MG/1
50 TABLET, FILM COATED ORAL 2 TIMES DAILY
Qty: 180 TABLET | Refills: 1 | Status: SHIPPED
Start: 2023-11-28 | End: 2023-11-28

## 2023-11-28 RX ORDER — LEVOTHYROXINE SODIUM 0.03 MG/1
25 TABLET ORAL DAILY
Qty: 116 TABLET | Refills: 1 | Status: SHIPPED
Start: 2023-11-28 | End: 2023-11-28

## 2023-11-28 RX ORDER — TIRZEPATIDE 7.5 MG/.5ML
7.5 INJECTION, SOLUTION SUBCUTANEOUS WEEKLY
Qty: 6 ML | Refills: 0 | Status: CANCELLED | OUTPATIENT
Start: 2023-11-28

## 2023-11-28 RX ORDER — LEVOTHYROXINE SODIUM 0.03 MG/1
25 TABLET ORAL DAILY
Qty: 116 TABLET | Refills: 1 | Status: SHIPPED | OUTPATIENT
Start: 2023-11-28

## 2023-11-28 RX ORDER — METOPROLOL TARTRATE 50 MG/1
50 TABLET, FILM COATED ORAL 2 TIMES DAILY
Qty: 180 TABLET | Refills: 1 | Status: SHIPPED | OUTPATIENT
Start: 2023-11-28

## 2023-11-28 RX ORDER — DAPAGLIFLOZIN 10 MG/1
10 TABLET, FILM COATED ORAL DAILY
Qty: 90 TABLET | Refills: 1 | Status: SHIPPED | OUTPATIENT
Start: 2023-11-28

## 2023-11-28 ASSESSMENT — ENCOUNTER SYMPTOMS
COUGH: 0
ABDOMINAL PAIN: 0
SHORTNESS OF BREATH: 0
DIARRHEA: 0
CONSTIPATION: 0
NAUSEA: 1

## 2023-12-07 NOTE — ASSESSMENT & PLAN NOTE
Chronic, improved with normal blood sugars, A1c tomorrow with last visit with endo  Continue Mounjaro 7.5 mg weekly, Farxiga 10 mg daily

## 2023-12-07 NOTE — PROGRESS NOTES
2nd request for Derm records sent, will continue to follow up.
Late entry from 11/28,   Records requested.
infections, skin issues resolved  Tolerating Mounjaro without side effects  Taking Farxiga at night  Home blood sugars 140-160s fasting, often forgets in evening  Has lost 5 lbs  Has labs tomorrow with endo then telemed next week    Review of Systems   Constitutional:  Positive for fatigue. Negative for diaphoresis. Eyes:  Negative for visual disturbance. Respiratory:  Negative for cough and shortness of breath. Cardiovascular:  Negative for chest pain, palpitations and leg swelling. Gastrointestinal:  Positive for nausea (rare). Negative for abdominal pain, constipation and diarrhea. Genitourinary:  Negative for difficulty urinating and dysuria. Neurological:  Negative for weakness, light-headedness, numbness and headaches. Medications:     Current Outpatient Medications:     Tirzepatide (MOUNJARO) 7.5 MG/0.5ML SOPN SC injection, Inject 0.5 mLs into the skin once a week, Disp: 6 mL, Rfl: 0    metoprolol tartrate (LOPRESSOR) 50 MG tablet, Take 1 tablet by mouth 2 times daily, Disp: 180 tablet, Rfl: 1    nystatin (MYCOSTATIN) 081259 UNIT/GM powder, Apply 3 times daily as needed to skin folds. , Disp: 60 g, Rfl: 1    ketoconazole (NIZORAL) 2 % cream, Apply topically daily. , Disp: 60 g, Rfl: 0    Cinnamon 500 MG CAPS, Take by mouth, Disp: , Rfl:     ONETOUCH VERIO strip, , Disp: , Rfl:     collagenase 250 UNIT/GM ointment, Santyl 250 unit/gram topical ointment  apply thin layer to affected area once daily, Disp: , Rfl:     Multiple Vitamins-Minerals (CENTRUM SILVER PO), Take by mouth, Disp: , Rfl:     clobetasol (TEMOVATE) 0.05 % ointment, Apply topically 2 times daily. , Disp: 30 g, Rfl: 0    ibuprofen (ADVIL;MOTRIN) 600 MG tablet, ibuprofen 600 mg tablet  TAKE 1 TABLET BY MOUTH EVERY 8 HOURS AS NEEDED FOR PAIN, Disp: , Rfl:     vitamin D (CHOLECALCIFEROL) 25 MCG (1000 UT) TABS tablet, Take 2 tablets by mouth daily, Disp: , Rfl:     Cyanocobalamin (B-12) 2500 MCG TABS, Take 2,500 mcg by mouth daily,

## 2024-01-11 ENCOUNTER — OFFICE VISIT (OUTPATIENT)
Dept: PRIMARY CARE CLINIC | Age: 64
End: 2024-01-11
Payer: COMMERCIAL

## 2024-01-11 VITALS
RESPIRATION RATE: 12 BRPM | HEART RATE: 88 BPM | WEIGHT: 216.2 LBS | TEMPERATURE: 98 F | BODY MASS INDEX: 36.02 KG/M2 | DIASTOLIC BLOOD PRESSURE: 80 MMHG | HEIGHT: 65 IN | OXYGEN SATURATION: 98 % | SYSTOLIC BLOOD PRESSURE: 138 MMHG

## 2024-01-11 DIAGNOSIS — J02.9 SORE THROAT: ICD-10-CM

## 2024-01-11 DIAGNOSIS — J01.40 ACUTE NON-RECURRENT PANSINUSITIS: Primary | ICD-10-CM

## 2024-01-11 LAB — S PYO AG THROAT QL: NORMAL

## 2024-01-11 PROCEDURE — 3079F DIAST BP 80-89 MM HG: CPT | Performed by: EMERGENCY MEDICINE

## 2024-01-11 PROCEDURE — 99213 OFFICE O/P EST LOW 20 MIN: CPT | Performed by: EMERGENCY MEDICINE

## 2024-01-11 PROCEDURE — 3075F SYST BP GE 130 - 139MM HG: CPT | Performed by: EMERGENCY MEDICINE

## 2024-01-11 PROCEDURE — 87880 STREP A ASSAY W/OPTIC: CPT | Performed by: EMERGENCY MEDICINE

## 2024-01-11 RX ORDER — TIRZEPATIDE 10 MG/.5ML
10 INJECTION, SOLUTION SUBCUTANEOUS WEEKLY
COMMUNITY
Start: 2023-12-28

## 2024-01-11 RX ORDER — AZITHROMYCIN 250 MG/1
250 TABLET, FILM COATED ORAL SEE ADMIN INSTRUCTIONS
Qty: 6 TABLET | Refills: 0 | Status: SHIPPED | OUTPATIENT
Start: 2024-01-11 | End: 2024-01-16

## 2024-01-11 RX ORDER — LANCETS 30 GAUGE
EACH MISCELLANEOUS
COMMUNITY
Start: 2023-12-15

## 2024-01-11 RX ORDER — GUAIFENESIN 600 MG/1
600 TABLET, EXTENDED RELEASE ORAL 2 TIMES DAILY
Qty: 30 TABLET | Refills: 0 | Status: SHIPPED | OUTPATIENT
Start: 2024-01-11 | End: 2024-01-26

## 2024-01-11 RX ORDER — PREDNISONE 20 MG/1
20 TABLET ORAL 2 TIMES DAILY
Qty: 10 TABLET | Refills: 0 | Status: SHIPPED | OUTPATIENT
Start: 2024-01-11 | End: 2024-01-16

## 2024-01-11 ASSESSMENT — ENCOUNTER SYMPTOMS
NAUSEA: 0
EYE DISCHARGE: 0
EYE REDNESS: 0
ABDOMINAL DISTENTION: 0
COUGH: 1
EYE PAIN: 0
SHORTNESS OF BREATH: 0
SORE THROAT: 1
VOMITING: 0
SINUS PRESSURE: 1
WHEEZING: 0
DIARRHEA: 0
BACK PAIN: 0

## 2024-01-11 ASSESSMENT — PATIENT HEALTH QUESTIONNAIRE - PHQ9
SUM OF ALL RESPONSES TO PHQ9 QUESTIONS 1 & 2: 0
SUM OF ALL RESPONSES TO PHQ QUESTIONS 1-9: 0
1. LITTLE INTEREST OR PLEASURE IN DOING THINGS: 0
2. FEELING DOWN, DEPRESSED OR HOPELESS: 0
SUM OF ALL RESPONSES TO PHQ QUESTIONS 1-9: 0

## 2024-01-11 NOTE — PROGRESS NOTES
answered.    REPEAT COVID TEST 24 hours  Salt water gargles  Cepachol prn throat pain    New Medications     New Prescriptions    AZITHROMYCIN (ZITHROMAX) 250 MG TABLET    Take 1 tablet by mouth See Admin Instructions for 5 days 500mg on day 1 followed by 250mg on days 2 - 5    GUAIFENESIN (MUCINEX) 600 MG EXTENDED RELEASE TABLET    Take 1 tablet by mouth 2 times daily for 15 days    PREDNISONE (DELTASONE) 20 MG TABLET    Take 1 tablet by mouth 2 times daily for 5 days       Electronically signed by Jesus Espana DO   DD: 1/11/24

## 2024-01-18 ENCOUNTER — TELEPHONE (OUTPATIENT)
Dept: PRIMARY CARE CLINIC | Age: 64
End: 2024-01-18

## 2024-01-18 DIAGNOSIS — J01.40 ACUTE NON-RECURRENT PANSINUSITIS: Primary | ICD-10-CM

## 2024-01-18 RX ORDER — AMOXICILLIN 875 MG/1
875 TABLET, COATED ORAL 2 TIMES DAILY
Qty: 14 TABLET | Refills: 0 | Status: SHIPPED | OUTPATIENT
Start: 2024-01-18 | End: 2024-01-25

## 2024-01-18 NOTE — TELEPHONE ENCOUNTER
Changed to amoxicillin for sinusitis per walk-in provider. Please advise her she may not feel better with either antibiotic if this is viral, which can take 2 weeks or more to resolve. If she is not feeling better by next week, she needs reevaluated.

## 2024-01-18 NOTE — TELEPHONE ENCOUNTER
Patient left  stating she was still not feeling better since last Thursday.  Advised patient to come back through [facility] to be seen but she wants to know if you could send something for her because she doesn't think she will be able to come in Friday.

## 2024-02-23 ENCOUNTER — TELEPHONE (OUTPATIENT)
Dept: PRIMARY CARE CLINIC | Age: 64
End: 2024-02-23

## 2024-02-23 DIAGNOSIS — Z12.31 BREAST CANCER SCREENING BY MAMMOGRAM: Primary | ICD-10-CM

## 2024-02-23 NOTE — TELEPHONE ENCOUNTER
Patient called asking for a Mammo order. She would to like to be scheduled with the Mammo van on march 11 th.

## 2024-03-05 ENCOUNTER — OFFICE VISIT (OUTPATIENT)
Dept: PRIMARY CARE CLINIC | Age: 64
End: 2024-03-05
Payer: COMMERCIAL

## 2024-03-05 VITALS
BODY MASS INDEX: 35.52 KG/M2 | HEART RATE: 78 BPM | OXYGEN SATURATION: 97 % | DIASTOLIC BLOOD PRESSURE: 86 MMHG | SYSTOLIC BLOOD PRESSURE: 130 MMHG | HEIGHT: 65 IN | TEMPERATURE: 97.3 F | WEIGHT: 213.2 LBS

## 2024-03-05 DIAGNOSIS — I10 ESSENTIAL HYPERTENSION: ICD-10-CM

## 2024-03-05 DIAGNOSIS — E11.9 TYPE 2 DIABETES MELLITUS WITHOUT COMPLICATION, WITHOUT LONG-TERM CURRENT USE OF INSULIN (HCC): Primary | ICD-10-CM

## 2024-03-05 DIAGNOSIS — E78.2 MIXED HYPERLIPIDEMIA: ICD-10-CM

## 2024-03-05 DIAGNOSIS — E03.9 HYPOTHYROIDISM, UNSPECIFIED TYPE: ICD-10-CM

## 2024-03-05 DIAGNOSIS — E55.9 VITAMIN D DEFICIENCY: ICD-10-CM

## 2024-03-05 DIAGNOSIS — E66.01 CLASS 2 SEVERE OBESITY WITH SERIOUS COMORBIDITY AND BODY MASS INDEX (BMI) OF 35.0 TO 35.9 IN ADULT, UNSPECIFIED OBESITY TYPE (HCC): ICD-10-CM

## 2024-03-05 LAB — HBA1C MFR BLD: 9.1 %

## 2024-03-05 PROCEDURE — 99214 OFFICE O/P EST MOD 30 MIN: CPT | Performed by: STUDENT IN AN ORGANIZED HEALTH CARE EDUCATION/TRAINING PROGRAM

## 2024-03-05 PROCEDURE — 3079F DIAST BP 80-89 MM HG: CPT | Performed by: STUDENT IN AN ORGANIZED HEALTH CARE EDUCATION/TRAINING PROGRAM

## 2024-03-05 PROCEDURE — 3046F HEMOGLOBIN A1C LEVEL >9.0%: CPT | Performed by: STUDENT IN AN ORGANIZED HEALTH CARE EDUCATION/TRAINING PROGRAM

## 2024-03-05 PROCEDURE — 83036 HEMOGLOBIN GLYCOSYLATED A1C: CPT | Performed by: STUDENT IN AN ORGANIZED HEALTH CARE EDUCATION/TRAINING PROGRAM

## 2024-03-05 PROCEDURE — 3075F SYST BP GE 130 - 139MM HG: CPT | Performed by: STUDENT IN AN ORGANIZED HEALTH CARE EDUCATION/TRAINING PROGRAM

## 2024-03-05 RX ORDER — TIRZEPATIDE 12.5 MG/.5ML
12.5 INJECTION, SOLUTION SUBCUTANEOUS WEEKLY
Qty: 13 ADJUSTABLE DOSE PRE-FILLED PEN SYRINGE | Refills: 0 | Status: SHIPPED
Start: 2024-03-05 | End: 2024-03-11 | Stop reason: SDUPTHER

## 2024-03-05 RX ORDER — METOPROLOL TARTRATE 50 MG/1
50 TABLET, FILM COATED ORAL 2 TIMES DAILY
Qty: 180 TABLET | Refills: 1 | Status: SHIPPED | OUTPATIENT
Start: 2024-03-05

## 2024-03-05 RX ORDER — LEVOTHYROXINE SODIUM 0.03 MG/1
25 TABLET ORAL DAILY
Qty: 116 TABLET | Refills: 1 | Status: SHIPPED | OUTPATIENT
Start: 2024-03-05

## 2024-03-05 RX ORDER — DAPAGLIFLOZIN 10 MG/1
10 TABLET, FILM COATED ORAL DAILY
Qty: 90 TABLET | Refills: 1 | Status: SHIPPED | OUTPATIENT
Start: 2024-03-05

## 2024-03-05 NOTE — PROGRESS NOTES
ESTABLISHED PRIMARY CARE VISIT    3/5/24  Name: Ann Marie Gold   : 1960   Age: 63 y.o.  Sex: female        Assessment & Plan:     Problem List Items Addressed This Visit       Essential hypertension     Chronic, controlled  Continue metoprolol 50 mg twice daily         Relevant Medications    metoprolol tartrate (LOPRESSOR) 50 MG tablet    Other Relevant Orders    Comprehensive Metabolic Panel, Fasting    CBC    Type 2 diabetes mellitus without complication, without long-term current use of insulin (HCC) - Primary     Chronic, uncontrolled with A1c 9.1  Increase Mounjaro 12.5 mg weekly, has been tolerating well  Continue Farxiga 10 mg daily  Recheck A1c next visit  Diabetic wellness when able         Relevant Medications    FARXIGA 10 MG tablet    Other Relevant Orders    POCT glycosylated hemoglobin (Hb A1C) (Completed)    Comprehensive Metabolic Panel, Fasting    CBC    Hypothyroidism     Chronic, controlled  Continue levothyroxine 25 mcg M-F, 50 mcg Sa-Jacobson  Recheck         Relevant Medications    levothyroxine (SYNTHROID) 25 MCG tablet    Other Relevant Orders    TSH    Vitamin D deficiency     Continue supplement  Recheck         Relevant Orders    Vitamin D 25 Hydroxy    Class 2 severe obesity with serious comorbidity and body mass index (BMI) of 35.0 to 35.9 in adult (HCC)     Chronic, slowly improving  Continue healthy lifestyle         Relevant Medications    FARXIGA 10 MG tablet    Mixed hyperlipidemia     10-year ASCVD risk score 9.4%  Recheck fasting lipids  Consider statin if risk score remains >7.5% due to diabetes         Relevant Medications    metoprolol tartrate (LOPRESSOR) 50 MG tablet    Other Relevant Orders    Lipid, Fasting     Counseled patient regarding above diagnosis, including possible risks and complications, especially if left uncontrolled.  Counseled patient as appropriate and relevant regarding any possible side effects, risks, and alternatives to treatment; the patient

## 2024-03-11 ENCOUNTER — HOSPITAL ENCOUNTER (OUTPATIENT)
Dept: MAMMOGRAPHY | Age: 64
Discharge: HOME | End: 2024-03-13
Payer: COMMERCIAL

## 2024-03-11 DIAGNOSIS — E11.9 TYPE 2 DIABETES MELLITUS WITHOUT COMPLICATION, WITHOUT LONG-TERM CURRENT USE OF INSULIN (HCC): ICD-10-CM

## 2024-03-11 DIAGNOSIS — Z12.31 BREAST CANCER SCREENING BY MAMMOGRAM: ICD-10-CM

## 2024-03-11 PROCEDURE — 77063 BREAST TOMOSYNTHESIS BI: CPT

## 2024-03-11 RX ORDER — TIRZEPATIDE 12.5 MG/.5ML
12.5 INJECTION, SOLUTION SUBCUTANEOUS WEEKLY
Qty: 13 ADJUSTABLE DOSE PRE-FILLED PEN SYRINGE | Refills: 0 | Status: SHIPPED | OUTPATIENT
Start: 2024-03-11

## 2024-03-11 ASSESSMENT — ENCOUNTER SYMPTOMS
CONSTIPATION: 0
COUGH: 0
DIARRHEA: 0
NAUSEA: 1
SHORTNESS OF BREATH: 0
ABDOMINAL PAIN: 0

## 2024-03-11 NOTE — ASSESSMENT & PLAN NOTE
Chronic, uncontrolled with A1c 9.1  Increase Mounjaro 12.5 mg weekly, has been tolerating well  Continue Farxiga 10 mg daily  Recheck A1c next visit  Diabetic wellness when able

## 2024-03-11 NOTE — TELEPHONE ENCOUNTER
Refill   Next leeann 6/4/24   Called states her Mounjaro that was upped to 12 has not been called in yet she states requesting for to be refilled.I see it was sent for 12.5 on 3/5/24

## 2024-03-11 NOTE — ASSESSMENT & PLAN NOTE
10-year ASCVD risk score 9.4%  Recheck fasting lipids  Consider statin if risk score remains >7.5% due to diabetes

## 2024-03-13 ENCOUNTER — TELEPHONE (OUTPATIENT)
Dept: PRIMARY CARE CLINIC | Age: 64
End: 2024-03-13

## 2024-03-13 DIAGNOSIS — E11.9 TYPE 2 DIABETES MELLITUS WITHOUT COMPLICATION, WITHOUT LONG-TERM CURRENT USE OF INSULIN (HCC): Primary | ICD-10-CM

## 2024-03-20 RX ORDER — DULAGLUTIDE 4.5 MG/.5ML
4.5 INJECTION, SOLUTION SUBCUTANEOUS WEEKLY
Qty: 13 ADJUSTABLE DOSE PRE-FILLED PEN SYRINGE | Refills: 0 | Status: SHIPPED | OUTPATIENT
Start: 2024-03-20

## 2024-03-21 NOTE — TELEPHONE ENCOUNTER
Dr Allen patient called asking what she should do about the Mounjaro  
Noted.   
Patient called and states she hasn't been able to get the mounjaro 12.5 anywhere and the pharmacies she has called told her they dont have any of the mounjaro in. She is not sure if you want to prescribe her something different?   
Patient called asking your thoughts on this.   
Patient left voice mail stating she talked to [facility] pharmacy in [place].   They do have the Trulicity in stock and she will be picking it up later today.     
Talked with patient. Mounjaro 12.5 mg not in stock anywhere. Also couldn't get her previous dose of 10 mg or next dose 15 mg.    Will try Trulicity 4.5 mg weekly. Patient will call tomorrow to see if in stock. May need PA.  
good, to achieve stated therapy goals

## 2024-04-10 ENCOUNTER — TELEPHONE (OUTPATIENT)
Dept: PRIMARY CARE CLINIC | Age: 64
End: 2024-04-10

## 2024-04-10 NOTE — TELEPHONE ENCOUNTER
Patient called in asking if you can send her something again for the sores that she would get.  She states its in the crease of her leg. I informed patient I will send a message to dr clement and ask but she will probably want you to be seen through [facility] and patient states she probably will not come through [facility] if that's what you advise because its the same thing she had before and its embarrassing.

## 2024-04-11 ENCOUNTER — OFFICE VISIT (OUTPATIENT)
Dept: PRIMARY CARE CLINIC | Age: 64
End: 2024-04-11
Payer: COMMERCIAL

## 2024-04-11 VITALS
BODY MASS INDEX: 34.99 KG/M2 | OXYGEN SATURATION: 97 % | TEMPERATURE: 97.6 F | HEART RATE: 74 BPM | HEIGHT: 65 IN | WEIGHT: 210 LBS | DIASTOLIC BLOOD PRESSURE: 82 MMHG | SYSTOLIC BLOOD PRESSURE: 120 MMHG

## 2024-04-11 DIAGNOSIS — E65 ABDOMINAL PANNICULUS, SYMPTOMATIC: ICD-10-CM

## 2024-04-11 DIAGNOSIS — E11.9 TYPE 2 DIABETES MELLITUS WITHOUT COMPLICATION, WITHOUT LONG-TERM CURRENT USE OF INSULIN (HCC): ICD-10-CM

## 2024-04-11 DIAGNOSIS — L02.91 ABSCESS: Primary | ICD-10-CM

## 2024-04-11 PROCEDURE — 99214 OFFICE O/P EST MOD 30 MIN: CPT | Performed by: STUDENT IN AN ORGANIZED HEALTH CARE EDUCATION/TRAINING PROGRAM

## 2024-04-11 PROCEDURE — 3079F DIAST BP 80-89 MM HG: CPT | Performed by: STUDENT IN AN ORGANIZED HEALTH CARE EDUCATION/TRAINING PROGRAM

## 2024-04-11 PROCEDURE — G2211 COMPLEX E/M VISIT ADD ON: HCPCS | Performed by: STUDENT IN AN ORGANIZED HEALTH CARE EDUCATION/TRAINING PROGRAM

## 2024-04-11 PROCEDURE — 3074F SYST BP LT 130 MM HG: CPT | Performed by: STUDENT IN AN ORGANIZED HEALTH CARE EDUCATION/TRAINING PROGRAM

## 2024-04-11 PROCEDURE — 3046F HEMOGLOBIN A1C LEVEL >9.0%: CPT | Performed by: STUDENT IN AN ORGANIZED HEALTH CARE EDUCATION/TRAINING PROGRAM

## 2024-04-11 RX ORDER — DOXYCYCLINE HYCLATE 100 MG
100 TABLET ORAL 2 TIMES DAILY
Qty: 14 TABLET | Refills: 0 | Status: SHIPPED | OUTPATIENT
Start: 2024-04-11 | End: 2024-04-18

## 2024-04-11 RX ORDER — CLINDAMYCIN PHOSPHATE 10 MG/G
GEL TOPICAL
Qty: 60 G | Refills: 1 | Status: SHIPPED | OUTPATIENT
Start: 2024-04-11 | End: 2024-04-18

## 2024-04-11 RX ORDER — NYSTATIN 100000 [USP'U]/G
POWDER TOPICAL
Qty: 60 G | Refills: 1 | Status: SHIPPED | OUTPATIENT
Start: 2024-04-11

## 2024-04-11 NOTE — PROGRESS NOTES
ESTABLISHED PRIMARY CARE VISIT    24  Name: Ann Marie Gold   : 1960   Age: 63 y.o.  Sex: female        Assessment & Plan:     Problem List Items Addressed This Visit       Type 2 diabetes mellitus without complication, without long-term current use of insulin (HCC)     Chronic, uncontrolled with A1c 9.1  Likely contributing to recurrent sores and infection  Continue Farxiga 10 mg daily, Trulicity 4.5 mg weekly  Recheck A1c next visit  Diabetic wellness when able         Abdominal panniculus, symptomatic     Start topical clindamycin  Continue nystatin powder also         Relevant Medications    nystatin (MYCOSTATIN) 640025 UNIT/GM powder    clindamycin (CLEOCIN-T) 1 % gel     Other Visit Diagnoses       Abscess    -  Primary    Abscess left pubis  Open, spontaneously draining  Pt declined I&D to open further, unable to tolerate expression of purulence  Treat with doxy, warm compresses    Relevant Medications    doxycycline hyclate (VIBRA-TABS) 100 MG tablet          Discussed risk of infection in this area given abdominopelvic fascial layers. Advised if not improving within 24-48 hours, would recommend ED.    Counseled patient regarding above diagnosis, including possible risks and complications, especially if left uncontrolled.  Counseled patient as appropriate and relevant regarding any possible side effects, risks, and alternatives to treatment; the patient verbalizes understanding, and is in agreement with the plan as detailed above.   All educational materials and instructions were discussed and included on the After Visit Summary.  All questions answered to the patient's satisfaction.  The patient was advised to call or send [employer] message for any concerns prior to next appointment.    Return if symptoms worsen or fail to improve.    This visit is inherently complex due to evaluation and management associated with medical care services that serve as the continuing focal point for

## 2024-04-18 NOTE — ASSESSMENT & PLAN NOTE
Chronic, uncontrolled with A1c 9.1  Likely contributing to recurrent sores and infection  Continue Farxiga 10 mg daily, Trulicity 4.5 mg weekly  Recheck A1c next visit  Diabetic wellness when able

## 2024-05-24 ENCOUNTER — NURSE ONLY (OUTPATIENT)
Dept: PRIMARY CARE CLINIC | Age: 64
End: 2024-05-24
Payer: COMMERCIAL

## 2024-05-24 DIAGNOSIS — I89.0 LYMPHEDEMA: Primary | ICD-10-CM

## 2024-05-24 DIAGNOSIS — I10 ESSENTIAL HYPERTENSION: ICD-10-CM

## 2024-05-24 DIAGNOSIS — E11.9 TYPE 2 DIABETES MELLITUS WITHOUT COMPLICATION, WITHOUT LONG-TERM CURRENT USE OF INSULIN (HCC): ICD-10-CM

## 2024-05-24 DIAGNOSIS — E55.9 VITAMIN D DEFICIENCY: ICD-10-CM

## 2024-05-24 DIAGNOSIS — G25.0 ESSENTIAL TREMOR: ICD-10-CM

## 2024-05-24 DIAGNOSIS — E03.9 HYPOTHYROIDISM, UNSPECIFIED TYPE: ICD-10-CM

## 2024-05-24 DIAGNOSIS — E78.2 MIXED HYPERLIPIDEMIA: ICD-10-CM

## 2024-05-24 DIAGNOSIS — N18.1 CKD (CHRONIC KIDNEY DISEASE) STAGE 1, GFR 90 ML/MIN OR GREATER: ICD-10-CM

## 2024-05-24 LAB
ALBUMIN: 4.1 G/DL (ref 3.5–5.2)
ALP BLD-CCNC: 71 U/L (ref 35–104)
ALT SERPL-CCNC: 13 U/L (ref 0–32)
ANION GAP SERPL CALCULATED.3IONS-SCNC: 15 MMOL/L (ref 7–16)
AST SERPL-CCNC: 18 U/L (ref 0–31)
BILIRUB SERPL-MCNC: 0.3 MG/DL (ref 0–1.2)
BUN BLDV-MCNC: 11 MG/DL (ref 6–23)
CALCIUM SERPL-MCNC: 9.2 MG/DL (ref 8.6–10.2)
CHLORIDE BLD-SCNC: 104 MMOL/L (ref 98–107)
CHOLESTEROL, FASTING: 243 MG/DL
CO2: 21 MMOL/L (ref 22–29)
CREAT SERPL-MCNC: 0.6 MG/DL (ref 0.5–1)
CREATININE URINE: 68.2 MG/DL (ref 29–226)
GFR, ESTIMATED: >90 ML/MIN/1.73M2
GLUCOSE FASTING: 176 MG/DL (ref 74–99)
HCT VFR BLD CALC: 46.7 % (ref 34–48)
HDLC SERPL-MCNC: 83 MG/DL
HEMOGLOBIN: 15.2 G/DL (ref 11.5–15.5)
LDL CHOLESTEROL: 139 MG/DL
MCH RBC QN AUTO: 28.8 PG (ref 26–35)
MCHC RBC AUTO-ENTMCNC: 32.5 G/DL (ref 32–34.5)
MCV RBC AUTO: 88.6 FL (ref 80–99.9)
MICROALBUMIN/CREAT 24H UR: <12 MG/L (ref 0–19)
MICROALBUMIN/CREAT UR-RTO: NORMAL MCG/MG CREAT (ref 0–30)
PDW BLD-RTO: 13.5 % (ref 11.5–15)
PLATELET # BLD: 251 K/UL (ref 130–450)
PMV BLD AUTO: 11 FL (ref 7–12)
POTASSIUM SERPL-SCNC: 4.4 MMOL/L (ref 3.5–5)
RBC # BLD: 5.27 M/UL (ref 3.5–5.5)
SODIUM BLD-SCNC: 140 MMOL/L (ref 132–146)
TOTAL PROTEIN: 7 G/DL (ref 6.4–8.3)
TRIGLYCERIDE, FASTING: 107 MG/DL
TSH SERPL DL<=0.05 MIU/L-ACNC: 1.06 UIU/ML (ref 0.27–4.2)
VITAMIN D 25-HYDROXY: 75.2 NG/ML (ref 30–100)
VLDLC SERPL CALC-MCNC: 21 MG/DL
WBC # BLD: 6.8 K/UL (ref 4.5–11.5)

## 2024-05-24 PROCEDURE — 36415 COLL VENOUS BLD VENIPUNCTURE: CPT | Performed by: STUDENT IN AN ORGANIZED HEALTH CARE EDUCATION/TRAINING PROGRAM

## 2024-06-04 ENCOUNTER — OFFICE VISIT (OUTPATIENT)
Dept: PRIMARY CARE CLINIC | Age: 64
End: 2024-06-04
Payer: COMMERCIAL

## 2024-06-04 VITALS
OXYGEN SATURATION: 97 % | HEART RATE: 79 BPM | BODY MASS INDEX: 34.85 KG/M2 | DIASTOLIC BLOOD PRESSURE: 84 MMHG | HEIGHT: 65 IN | SYSTOLIC BLOOD PRESSURE: 130 MMHG | TEMPERATURE: 97.4 F | WEIGHT: 209.2 LBS

## 2024-06-04 DIAGNOSIS — Z12.11 SCREENING FOR COLORECTAL CANCER: ICD-10-CM

## 2024-06-04 DIAGNOSIS — E65 ABDOMINAL PANNICULUS, SYMPTOMATIC: ICD-10-CM

## 2024-06-04 DIAGNOSIS — Z12.12 SCREENING FOR COLORECTAL CANCER: ICD-10-CM

## 2024-06-04 DIAGNOSIS — I10 ESSENTIAL HYPERTENSION: ICD-10-CM

## 2024-06-04 DIAGNOSIS — E11.9 TYPE 2 DIABETES MELLITUS WITHOUT COMPLICATION, WITHOUT LONG-TERM CURRENT USE OF INSULIN (HCC): Primary | ICD-10-CM

## 2024-06-04 DIAGNOSIS — E78.2 MIXED HYPERLIPIDEMIA: ICD-10-CM

## 2024-06-04 DIAGNOSIS — E03.9 ACQUIRED HYPOTHYROIDISM: ICD-10-CM

## 2024-06-04 DIAGNOSIS — E66.01 CLASS 2 SEVERE OBESITY WITH SERIOUS COMORBIDITY AND BODY MASS INDEX (BMI) OF 35.0 TO 35.9 IN ADULT, UNSPECIFIED OBESITY TYPE (HCC): ICD-10-CM

## 2024-06-04 LAB — HBA1C MFR BLD: 8.9 %

## 2024-06-04 PROCEDURE — G2211 COMPLEX E/M VISIT ADD ON: HCPCS | Performed by: STUDENT IN AN ORGANIZED HEALTH CARE EDUCATION/TRAINING PROGRAM

## 2024-06-04 PROCEDURE — 99214 OFFICE O/P EST MOD 30 MIN: CPT | Performed by: STUDENT IN AN ORGANIZED HEALTH CARE EDUCATION/TRAINING PROGRAM

## 2024-06-04 PROCEDURE — 83036 HEMOGLOBIN GLYCOSYLATED A1C: CPT | Performed by: STUDENT IN AN ORGANIZED HEALTH CARE EDUCATION/TRAINING PROGRAM

## 2024-06-04 PROCEDURE — 3052F HG A1C>EQUAL 8.0%<EQUAL 9.0%: CPT | Performed by: STUDENT IN AN ORGANIZED HEALTH CARE EDUCATION/TRAINING PROGRAM

## 2024-06-04 PROCEDURE — 3075F SYST BP GE 130 - 139MM HG: CPT | Performed by: STUDENT IN AN ORGANIZED HEALTH CARE EDUCATION/TRAINING PROGRAM

## 2024-06-04 PROCEDURE — 3079F DIAST BP 80-89 MM HG: CPT | Performed by: STUDENT IN AN ORGANIZED HEALTH CARE EDUCATION/TRAINING PROGRAM

## 2024-06-04 RX ORDER — NYSTATIN 100000 [USP'U]/G
POWDER TOPICAL
Qty: 60 G | Refills: 1 | Status: SHIPPED | OUTPATIENT
Start: 2024-06-04

## 2024-06-04 RX ORDER — DAPAGLIFLOZIN 10 MG/1
10 TABLET, FILM COATED ORAL DAILY
Qty: 90 TABLET | Refills: 1 | Status: SHIPPED | OUTPATIENT
Start: 2024-06-04

## 2024-06-04 RX ORDER — NYSTATIN 100000 U/G
CREAM TOPICAL
Qty: 30 G | Refills: 1 | Status: SHIPPED | OUTPATIENT
Start: 2024-06-04

## 2024-06-04 RX ORDER — LEVOTHYROXINE SODIUM 0.03 MG/1
25 TABLET ORAL DAILY
Qty: 116 TABLET | Refills: 1 | Status: SHIPPED | OUTPATIENT
Start: 2024-06-04

## 2024-06-04 RX ORDER — METOPROLOL TARTRATE 50 MG/1
50 TABLET, FILM COATED ORAL 2 TIMES DAILY
Qty: 180 TABLET | Refills: 1 | Status: SHIPPED | OUTPATIENT
Start: 2024-06-04

## 2024-06-04 RX ORDER — ATORVASTATIN CALCIUM 20 MG/1
20 TABLET, FILM COATED ORAL NIGHTLY
Qty: 90 TABLET | Refills: 0 | Status: SHIPPED | OUTPATIENT
Start: 2024-06-04

## 2024-06-04 SDOH — ECONOMIC STABILITY: FOOD INSECURITY: WITHIN THE PAST 12 MONTHS, YOU WORRIED THAT YOUR FOOD WOULD RUN OUT BEFORE YOU GOT MONEY TO BUY MORE.: NEVER TRUE

## 2024-06-04 SDOH — ECONOMIC STABILITY: FOOD INSECURITY: WITHIN THE PAST 12 MONTHS, THE FOOD YOU BOUGHT JUST DIDN'T LAST AND YOU DIDN'T HAVE MONEY TO GET MORE.: NEVER TRUE

## 2024-06-04 SDOH — ECONOMIC STABILITY: INCOME INSECURITY: HOW HARD IS IT FOR YOU TO PAY FOR THE VERY BASICS LIKE FOOD, HOUSING, MEDICAL CARE, AND HEATING?: NOT HARD AT ALL

## 2024-06-04 ASSESSMENT — ENCOUNTER SYMPTOMS
CONSTIPATION: 0
SHORTNESS OF BREATH: 0
DIARRHEA: 0
ABDOMINAL PAIN: 0
NAUSEA: 1
COUGH: 0

## 2024-06-04 NOTE — ASSESSMENT & PLAN NOTE
LDL remains elevated  10-year ASCVD risk score 9.6%  Start atorvastatin 10 mg nightly  Recheck lipids and liver function next visit

## 2024-06-04 NOTE — PROGRESS NOTES
MG/0.5ML SOPN SC injection, Inject 0.5 mLs into the skin once a week, Disp: 13 Adjustable Dose Pre-filled Pen Syringe, Rfl: 0    FARXIGA 10 MG tablet, Take 1 tablet by mouth daily, Disp: 90 tablet, Rfl: 1    levothyroxine (SYNTHROID) 25 MCG tablet, Take 1 tablet by mouth Daily except 50 mg Saturday and Sunday, Disp: 116 tablet, Rfl: 1    metoprolol tartrate (LOPRESSOR) 50 MG tablet, Take 1 tablet by mouth 2 times daily, Disp: 180 tablet, Rfl: 1    Lancets (ONETOUCH DELICA PLUS [id number]) MISC, , Disp: , Rfl:     ketoconazole (NIZORAL) 2 % cream, Apply topically daily., Disp: 60 g, Rfl: 0    Cinnamon 500 MG CAPS, Take by mouth, Disp: , Rfl:     ONETOUCH VERIO strip, , Disp: , Rfl:     collagenase 250 UNIT/GM ointment, Santyl 250 unit/gram topical ointment  apply thin layer to affected area once daily, Disp: , Rfl:     Multiple Vitamins-Minerals (CENTRUM SILVER PO), Take by mouth, Disp: , Rfl:     clobetasol (TEMOVATE) 0.05 % ointment, Apply topically 2 times daily., Disp: 30 g, Rfl: 0    ibuprofen (ADVIL;MOTRIN) 600 MG tablet, ibuprofen 600 mg tablet  TAKE 1 TABLET BY MOUTH EVERY 8 HOURS AS NEEDED FOR PAIN, Disp: , Rfl:     vitamin D (CHOLECALCIFEROL) 25 MCG (1000 UT) TABS tablet, Take 2 tablets by mouth daily, Disp: , Rfl:     Cyanocobalamin (B-12) 2500 MCG TABS, Take 2,500 mcg by mouth daily, Disp: , Rfl:     Blood Pressure KIT, Use to check blood pressure 2-3x weekly at home., Disp: 1 kit, Rfl: 0    Biotin 1 MG CAPS, Take 1 capsule by mouth daily, Disp: , Rfl:     furosemide (LASIX) 20 MG tablet, Take 1 tablet by mouth as needed, Disp: , Rfl:     POTASSIUM CHLORIDE PO, Take by mouth as needed with furosemide, Disp: , Rfl:     Physical Exam:     Vitals:    06/04/24 0916   BP: 130/84   Site: Left Upper Arm   Position: Sitting   Cuff Size: Large Adult   Pulse: 79   Temp: 97.4 °F (36.3 °C)   SpO2: 97%   Weight: 94.9 kg (209 lb 3.2 oz)   Height: 1.651 m (5' 5\")     BP Readings from Last 3 Encounters:   06/04/24 130/84

## 2024-06-04 NOTE — ASSESSMENT & PLAN NOTE
Chronic, uncontrolled with A1c 8.9  Likely contributing to recurrent sores and infection  Increase Mounjaro 15 mg weekly  Continue Farxiga 10 mg daily  Recheck A1c next visit  Diabetic wellness when able

## 2024-07-09 LAB — NONINV COLON CA DNA+OCC BLD SCRN STL QL: NEGATIVE

## 2024-07-11 ENCOUNTER — TELEPHONE (OUTPATIENT)
Dept: PRIMARY CARE CLINIC | Age: 64
End: 2024-07-11

## 2024-07-11 NOTE — TELEPHONE ENCOUNTER
Patient wants to know if it is okay to take Berberine plus [place] Cinnamon 1200x2 . With her other medication.

## 2024-07-11 NOTE — TELEPHONE ENCOUNTER
Both may lower blood pressure in addition to her other medications but her BP is high-normal. OK to take with her medications. High amounts of cinnamon can harm the liver.

## 2024-08-11 DIAGNOSIS — I10 ESSENTIAL HYPERTENSION: ICD-10-CM

## 2024-08-13 RX ORDER — METOPROLOL TARTRATE 50 MG
50 TABLET ORAL 2 TIMES DAILY
Qty: 28 TABLET | OUTPATIENT
Start: 2024-08-13

## 2024-08-13 NOTE — TELEPHONE ENCOUNTER
Patients last appointment 6/4/2024.  Patients next scheduled appointment   Future Appointments   Date Time Provider Department Center   9/5/2024  9:30 AM Hammad Allen MD [facility] DEP   12/10/2024 10:00 AM Hammad Allen MD [facility] DEP

## 2024-08-21 ENCOUNTER — LAB (OUTPATIENT)
Dept: PRIMARY CARE CLINIC | Age: 64
End: 2024-08-21
Payer: COMMERCIAL

## 2024-08-21 DIAGNOSIS — E78.2 MIXED HYPERLIPIDEMIA: ICD-10-CM

## 2024-08-21 PROCEDURE — 36415 COLL VENOUS BLD VENIPUNCTURE: CPT | Performed by: STUDENT IN AN ORGANIZED HEALTH CARE EDUCATION/TRAINING PROGRAM

## 2024-08-22 LAB
ALBUMIN: 4.5 G/DL (ref 3.5–5.2)
ALP BLD-CCNC: 79 U/L (ref 35–104)
ALT SERPL-CCNC: 16 U/L (ref 0–32)
ANION GAP SERPL CALCULATED.3IONS-SCNC: 21 MMOL/L (ref 7–16)
AST SERPL-CCNC: 21 U/L (ref 0–31)
BILIRUB SERPL-MCNC: 0.5 MG/DL (ref 0–1.2)
BUN BLDV-MCNC: 14 MG/DL (ref 6–23)
CALCIUM SERPL-MCNC: 9.7 MG/DL (ref 8.6–10.2)
CHLORIDE BLD-SCNC: 101 MMOL/L (ref 98–107)
CHOLESTEROL, FASTING: 247 MG/DL
CO2: 19 MMOL/L (ref 22–29)
CREAT SERPL-MCNC: 0.6 MG/DL (ref 0.5–1)
GFR, ESTIMATED: >90 ML/MIN/1.73M2
GLUCOSE FASTING: 204 MG/DL (ref 74–99)
HDLC SERPL-MCNC: 77 MG/DL
LDL CHOLESTEROL: 138 MG/DL
POTASSIUM SERPL-SCNC: 5 MMOL/L (ref 3.5–5)
SODIUM BLD-SCNC: 141 MMOL/L (ref 132–146)
TOTAL PROTEIN: 7.3 G/DL (ref 6.4–8.3)
TRIGLYCERIDE, FASTING: 160 MG/DL
VLDLC SERPL CALC-MCNC: 32 MG/DL

## 2024-08-28 DIAGNOSIS — E03.9 ACQUIRED HYPOTHYROIDISM: ICD-10-CM

## 2024-08-28 DIAGNOSIS — E11.9 TYPE 2 DIABETES MELLITUS WITHOUT COMPLICATION, WITHOUT LONG-TERM CURRENT USE OF INSULIN (HCC): ICD-10-CM

## 2024-08-28 DIAGNOSIS — I10 ESSENTIAL HYPERTENSION: ICD-10-CM

## 2024-08-28 LAB — DIABETIC RETINOPATHY: NEGATIVE

## 2024-08-28 RX ORDER — METOPROLOL TARTRATE 50 MG
50 TABLET ORAL 2 TIMES DAILY
Qty: 180 TABLET | Refills: 1 | Status: CANCELLED | OUTPATIENT
Start: 2024-08-28

## 2024-08-28 RX ORDER — LEVOTHYROXINE SODIUM 25 UG/1
25 TABLET ORAL DAILY
Qty: 116 TABLET | Refills: 1 | Status: CANCELLED | OUTPATIENT
Start: 2024-08-28

## 2024-08-28 RX ORDER — DAPAGLIFLOZIN 10 MG/1
10 TABLET, FILM COATED ORAL DAILY
Qty: 90 TABLET | Refills: 1 | Status: CANCELLED | OUTPATIENT
Start: 2024-08-28

## 2024-08-28 NOTE — TELEPHONE ENCOUNTER
Patients last appointment 6/4/2024.  Patients next scheduled appointment   Future Appointments   Date Time Provider Department Center   9/5/2024  9:30 AM Hammad Allen MD [facility] DEP   12/10/2024 10:00 AM Hammad Allen MD [facility] DEP      Pt states \"my husbands work may be going on strike and I need my prescriptions before they shut down on Friday or Saturday and I wanted the doctor to know that I'm no longer on Mounjaro\"

## 2024-09-05 ENCOUNTER — OFFICE VISIT (OUTPATIENT)
Dept: PRIMARY CARE CLINIC | Age: 64
End: 2024-09-05

## 2024-09-05 VITALS
BODY MASS INDEX: 33.95 KG/M2 | HEART RATE: 64 BPM | DIASTOLIC BLOOD PRESSURE: 82 MMHG | HEIGHT: 65 IN | WEIGHT: 203.8 LBS | OXYGEN SATURATION: 96 % | TEMPERATURE: 97 F | SYSTOLIC BLOOD PRESSURE: 128 MMHG

## 2024-09-05 DIAGNOSIS — E03.9 ACQUIRED HYPOTHYROIDISM: ICD-10-CM

## 2024-09-05 DIAGNOSIS — E11.9 TYPE 2 DIABETES MELLITUS WITHOUT COMPLICATION, WITHOUT LONG-TERM CURRENT USE OF INSULIN (HCC): Primary | ICD-10-CM

## 2024-09-05 DIAGNOSIS — E66.9 CLASS 1 OBESITY WITH SERIOUS COMORBIDITY AND BODY MASS INDEX (BMI) OF 33.0 TO 33.9 IN ADULT, UNSPECIFIED OBESITY TYPE: ICD-10-CM

## 2024-09-05 DIAGNOSIS — E78.2 MIXED HYPERLIPIDEMIA: ICD-10-CM

## 2024-09-05 PROBLEM — E66.811 CLASS 1 OBESITY WITH SERIOUS COMORBIDITY AND BODY MASS INDEX (BMI) OF 33.0 TO 33.9 IN ADULT: Status: ACTIVE | Noted: 2023-08-08

## 2024-09-05 LAB — HBA1C MFR BLD: 11 %

## 2024-09-05 ASSESSMENT — ENCOUNTER SYMPTOMS
CONSTIPATION: 0
DIARRHEA: 0
COUGH: 0
ABDOMINAL PAIN: 0
SHORTNESS OF BREATH: 0

## 2024-09-05 NOTE — PROGRESS NOTES
ESTABLISHED PRIMARY CARE VISIT    24  Name: Ann Marie Gold   : 1960   Age: 64 y.o.  Sex: female        Assessment & Plan:     Problem List Items Addressed This Visit       Type 2 diabetes mellitus without complication, without long-term current use of insulin (HCC) - Primary     Chronic, uncontrolled with A1c 11  Stopped Mounjaro due to side effects  Failed other GLP-1s, insulin, metformin due to side effects  Trial Januvia 100 mg daily  Continue Farxiga 10 mg daily  Recheck A1c next visit  Diabetic wellness when able         Relevant Medications    SITagliptin (JANUVIA) 100 MG tablet    Other Relevant Orders    POCT glycosylated hemoglobin (Hb A1C) (Completed)    Hypothyroidism     Chronic, controlled  Continue levothyroxine 25 mcg M-F, 50 mcg Sa-Jacobson         Relevant Orders    TSH    Class 1 obesity with serious comorbidity and body mass index (BMI) of 33.0 to 33.9 in adult     Chronic, slowly improving  Continue healthy lifestyle         Relevant Medications    SITagliptin (JANUVIA) 100 MG tablet    Mixed hyperlipidemia     Chronic, uncontrolled  10-year ASCVD risk score 10.9%  Restart atorvastatin 20 mg nightly  Recheck lipids and liver function next visit         Relevant Orders    Comprehensive Metabolic Panel, Fasting    Lipid, Fasting     Counseled patient regarding above diagnosis, including possible risks and complications, especially if left uncontrolled.  Counseled patient as appropriate and relevant regarding any possible side effects, risks, and alternatives to treatment; the patient verbalizes understanding, and is in agreement with the plan as detailed above.   All educational materials and instructions were discussed and included on the After Visit Summary.  All questions answered to the patient's satisfaction.  The patient was advised to call or send [employer] message for any concerns prior to next appointment.    Return in about 3 months (around 2024) for

## 2024-09-05 NOTE — ASSESSMENT & PLAN NOTE
Chronic, uncontrolled  10-year ASCVD risk score 10.9%  Restart atorvastatin 20 mg nightly  Recheck lipids and liver function next visit

## 2024-09-05 NOTE — ASSESSMENT & PLAN NOTE
Chronic, uncontrolled with A1c 11  Stopped Mounjaro due to side effects  Failed other GLP-1s, insulin, metformin due to side effects  Trial Januvia 100 mg daily  Continue Farxiga 10 mg daily  Recheck A1c next visit  Diabetic wellness when able

## 2024-09-12 DIAGNOSIS — I10 ESSENTIAL HYPERTENSION: ICD-10-CM

## 2024-09-12 DIAGNOSIS — E11.9 TYPE 2 DIABETES MELLITUS WITHOUT COMPLICATION, WITHOUT LONG-TERM CURRENT USE OF INSULIN (HCC): ICD-10-CM

## 2024-09-12 RX ORDER — DAPAGLIFLOZIN 10 MG/1
10 TABLET, FILM COATED ORAL DAILY
Qty: 90 TABLET | Refills: 0 | Status: SHIPPED | OUTPATIENT
Start: 2024-09-12

## 2024-09-12 RX ORDER — METOPROLOL TARTRATE 50 MG
50 TABLET ORAL 2 TIMES DAILY
Qty: 180 TABLET | Refills: 0 | Status: SHIPPED | OUTPATIENT
Start: 2024-09-12

## 2024-12-02 DIAGNOSIS — E78.2 MIXED HYPERLIPIDEMIA: ICD-10-CM

## 2024-12-02 RX ORDER — ATORVASTATIN CALCIUM 20 MG/1
20 TABLET, FILM COATED ORAL NIGHTLY
Qty: 90 TABLET | Refills: 0 | Status: SHIPPED | OUTPATIENT
Start: 2024-12-02

## 2024-12-02 NOTE — TELEPHONE ENCOUNTER
Name of Medication(s) Requested:  Requested Prescriptions     Pending Prescriptions Disp Refills    atorvastatin (LIPITOR) 20 MG tablet 90 tablet 0     Sig: Take 1 tablet by mouth at bedtime       Medication is on current medication list Yes    Dosage and directions were verified? Yes    Quantity verified: 90 day supply     Pharmacy Verified?  Yes    Last Appointment:  9/5/2024    Future appts:  Future Appointments   Date Time Provider Department Center   1/15/2025  8:00 AM SCHEDULE, [facility] MANZO [facility] DEP   1/21/2025  1:00 PM Emily Sheikh APRN - CNP [facility] DEP        (If no appt send self scheduling link. .REFILLAPPT)  Scheduling request sent?     [] Yes  [x] No    Does patient need updated?  [] Yes  [x] No

## 2024-12-20 DIAGNOSIS — E65 ABDOMINAL PANNICULUS, SYMPTOMATIC: ICD-10-CM

## 2024-12-20 RX ORDER — NYSTATIN 100000 [USP'U]/G
POWDER TOPICAL
Qty: 60 G | Refills: 1 | Status: SHIPPED | OUTPATIENT
Start: 2024-12-20

## 2024-12-20 RX ORDER — NYSTATIN 100000 [USP'U]/G
POWDER TOPICAL
Qty: 60 G | Refills: 1 | Status: CANCELLED | OUTPATIENT
Start: 2024-12-20

## 2024-12-20 NOTE — TELEPHONE ENCOUNTER
Name of Medication(s) Requested:  Requested Prescriptions     Pending Prescriptions Disp Refills    nystatin (MYCOSTATIN) 061059 UNIT/GM powder 60 g 1     Sig: Apply 3 times daily as needed to skin folds.       Medication is on current medication list Yes    Dosage and directions were verified? Yes    Quantity verified: 90 day supply     Pharmacy Verified?  Yes    Last Appointment:  9/5/2024    Future appts:  Future Appointments   Date Time Provider Department Center   1/15/2025  8:00 AM SCHEDULE, [facility] DEP   1/21/2025  1:00 PM [facility]        (If no appt send self scheduling link. .REFILLAPPT)  Scheduling request sent?     [] Yes  [x] No    Does patient need updated?  [] Yes  [x] No

## 2024-12-20 NOTE — TELEPHONE ENCOUNTER
Name of Medication(s) Requested:  Requested Prescriptions     Pending Prescriptions Disp Refills    nystatin (MYCOSTATIN) 368759 UNIT/GM powder 60 g 1     Sig: Apply 3 times daily as needed to skin folds.       Medication is on current medication list Yes    Dosage and directions were verified? Yes    Quantity verified: 90 day supply     Pharmacy Verified?  Yes    Last Appointment:  9/5/2024    Future appts:  Future Appointments   Date Time Provider Department Center   1/15/2025  8:00 AM SCHEDULE, [place] [facility] DEP   1/21/2025  1:00 PM Emily [facility]        (If no appt send self scheduling link. .REFILLAPPT)  Scheduling request sent?     [] Yes  [x] No    Does patient need updated?  [] Yes  [x] No

## 2025-01-03 DIAGNOSIS — I10 ESSENTIAL HYPERTENSION: ICD-10-CM

## 2025-01-03 NOTE — TELEPHONE ENCOUNTER
Name of Medication(s) Requested:  Requested Prescriptions     Pending Prescriptions Disp Refills    metoprolol tartrate (LOPRESSOR) 50 MG tablet 60 tablet 0     Sig: Take 1 tablet by mouth 2 times daily    metoprolol tartrate (LOPRESSOR) 50 MG tablet 180 tablet 1     Sig: Take 1 tablet by mouth 2 times daily       Medication is on current medication list Yes    Dosage and directions were verified? Yes    Quantity verified: 90 day supply     Pharmacy Verified?  Yes    Last Appointment:  9/5/2024    Future appts:  Future Appointments   Date Time Provider Department Center   1/15/2025  8:00 AM SCHEDULE, [facility]ULIDO [facility] DEP   1/21/2025  1:00 PM Emily Sheikh APRN - CNP [facility] DEP        (If no appt send self scheduling link. .REFILLAPPT)  Scheduling request sent?     [] Yes  [x] No    Does patient need updated?  [] Yes  [x] No

## 2025-01-06 RX ORDER — METOPROLOL TARTRATE 50 MG
50 TABLET ORAL 2 TIMES DAILY
Qty: 60 TABLET | Refills: 0 | Status: SHIPPED | OUTPATIENT
Start: 2025-01-06

## 2025-01-06 RX ORDER — METOPROLOL TARTRATE 50 MG
50 TABLET ORAL 2 TIMES DAILY
Qty: 180 TABLET | Refills: 1 | Status: SHIPPED | OUTPATIENT
Start: 2025-01-06

## 2025-01-15 ENCOUNTER — LAB (OUTPATIENT)
Dept: PRIMARY CARE CLINIC | Age: 65
End: 2025-01-15
Payer: COMMERCIAL

## 2025-01-15 DIAGNOSIS — E55.9 VITAMIN D DEFICIENCY: ICD-10-CM

## 2025-01-15 DIAGNOSIS — E11.9 TYPE 2 DIABETES MELLITUS WITHOUT COMPLICATION, WITHOUT LONG-TERM CURRENT USE OF INSULIN (HCC): ICD-10-CM

## 2025-01-15 DIAGNOSIS — E03.9 HYPOTHYROIDISM, UNSPECIFIED TYPE: ICD-10-CM

## 2025-01-15 DIAGNOSIS — E03.9 ACQUIRED HYPOTHYROIDISM: ICD-10-CM

## 2025-01-15 DIAGNOSIS — E78.2 MIXED HYPERLIPIDEMIA: ICD-10-CM

## 2025-01-15 DIAGNOSIS — Z00.00 ROUTINE CHECK-UP: ICD-10-CM

## 2025-01-15 DIAGNOSIS — I10 ESSENTIAL HYPERTENSION: Primary | ICD-10-CM

## 2025-01-15 PROCEDURE — 36415 COLL VENOUS BLD VENIPUNCTURE: CPT | Performed by: STUDENT IN AN ORGANIZED HEALTH CARE EDUCATION/TRAINING PROGRAM

## 2025-01-16 LAB
ALBUMIN: 4 G/DL (ref 3.5–5.2)
ALP BLD-CCNC: 69 U/L (ref 35–104)
ALT SERPL-CCNC: 14 U/L (ref 0–32)
ANION GAP SERPL CALCULATED.3IONS-SCNC: 14 MMOL/L (ref 7–16)
AST SERPL-CCNC: 15 U/L (ref 0–31)
BILIRUB SERPL-MCNC: 0.3 MG/DL (ref 0–1.2)
BUN BLDV-MCNC: 14 MG/DL (ref 6–23)
CALCIUM SERPL-MCNC: 9.4 MG/DL (ref 8.6–10.2)
CHLORIDE BLD-SCNC: 104 MMOL/L (ref 98–107)
CHOLESTEROL, FASTING: 153 MG/DL
CO2: 24 MMOL/L (ref 22–29)
CREAT SERPL-MCNC: 0.6 MG/DL (ref 0.5–1)
GFR, ESTIMATED: >90 ML/MIN/1.73M2
GLUCOSE FASTING: 215 MG/DL (ref 74–99)
HDLC SERPL-MCNC: 62 MG/DL
LDL CHOLESTEROL: 72 MG/DL
POTASSIUM SERPL-SCNC: 4.6 MMOL/L (ref 3.5–5)
SODIUM BLD-SCNC: 142 MMOL/L (ref 132–146)
TOTAL PROTEIN: 6.8 G/DL (ref 6.4–8.3)
TRIGLYCERIDE, FASTING: 94 MG/DL
TSH SERPL DL<=0.05 MIU/L-ACNC: 1.75 UIU/ML (ref 0.27–4.2)
VLDLC SERPL CALC-MCNC: 19 MG/DL

## 2025-01-21 ENCOUNTER — OFFICE VISIT (OUTPATIENT)
Dept: PRIMARY CARE CLINIC | Age: 65
End: 2025-01-21

## 2025-01-21 VITALS
HEART RATE: 70 BPM | HEIGHT: 65 IN | BODY MASS INDEX: 32.82 KG/M2 | SYSTOLIC BLOOD PRESSURE: 138 MMHG | TEMPERATURE: 97.3 F | OXYGEN SATURATION: 96 % | RESPIRATION RATE: 20 BRPM | WEIGHT: 197 LBS | DIASTOLIC BLOOD PRESSURE: 88 MMHG

## 2025-01-21 DIAGNOSIS — E03.9 ACQUIRED HYPOTHYROIDISM: ICD-10-CM

## 2025-01-21 DIAGNOSIS — N18.1 TYPE 2 DIABETES MELLITUS WITH STAGE 1 CHRONIC KIDNEY DISEASE, WITH LONG-TERM CURRENT USE OF INSULIN (HCC): ICD-10-CM

## 2025-01-21 DIAGNOSIS — E11.9 TYPE 2 DIABETES MELLITUS WITHOUT COMPLICATION, WITHOUT LONG-TERM CURRENT USE OF INSULIN (HCC): ICD-10-CM

## 2025-01-21 DIAGNOSIS — Z13.21 ENCOUNTER FOR VITAMIN DEFICIENCY SCREENING: ICD-10-CM

## 2025-01-21 DIAGNOSIS — I10 ESSENTIAL HYPERTENSION: Primary | ICD-10-CM

## 2025-01-21 DIAGNOSIS — E55.9 VITAMIN D DEFICIENCY: ICD-10-CM

## 2025-01-21 DIAGNOSIS — E11.22 TYPE 2 DIABETES MELLITUS WITH STAGE 1 CHRONIC KIDNEY DISEASE, WITH LONG-TERM CURRENT USE OF INSULIN (HCC): ICD-10-CM

## 2025-01-21 DIAGNOSIS — E78.2 MIXED HYPERLIPIDEMIA: ICD-10-CM

## 2025-01-21 DIAGNOSIS — Z79.899 MEDICATION COURSE CHANGED: ICD-10-CM

## 2025-01-21 DIAGNOSIS — Z79.4 TYPE 2 DIABETES MELLITUS WITH STAGE 1 CHRONIC KIDNEY DISEASE, WITH LONG-TERM CURRENT USE OF INSULIN (HCC): ICD-10-CM

## 2025-01-21 LAB — HBA1C MFR BLD: 11.6 %

## 2025-01-21 RX ORDER — FUROSEMIDE 20 MG/1
20 TABLET ORAL AS NEEDED
Qty: 60 TABLET | Refills: 1 | Status: SHIPPED | OUTPATIENT
Start: 2025-01-21

## 2025-01-21 RX ORDER — LEVOTHYROXINE SODIUM 25 UG/1
25 TABLET ORAL DAILY
Qty: 116 TABLET | Refills: 1 | Status: SHIPPED | OUTPATIENT
Start: 2025-01-21

## 2025-01-21 RX ORDER — METOPROLOL TARTRATE 50 MG
50 TABLET ORAL 2 TIMES DAILY
Qty: 180 TABLET | Refills: 1 | Status: SHIPPED | OUTPATIENT
Start: 2025-01-21

## 2025-01-21 RX ORDER — ATORVASTATIN CALCIUM 20 MG/1
20 TABLET, FILM COATED ORAL NIGHTLY
Qty: 90 TABLET | Refills: 1 | Status: SHIPPED | OUTPATIENT
Start: 2025-01-21

## 2025-01-21 RX ORDER — DAPAGLIFLOZIN 10 MG/1
10 TABLET, FILM COATED ORAL DAILY
Qty: 90 TABLET | Refills: 0 | Status: SHIPPED | OUTPATIENT
Start: 2025-01-21

## 2025-01-21 RX ORDER — PIOGLITAZONE 15 MG/1
15 TABLET ORAL DAILY
Qty: 30 TABLET | Refills: 3 | Status: SHIPPED | OUTPATIENT
Start: 2025-01-21

## 2025-01-21 SDOH — ECONOMIC STABILITY: FOOD INSECURITY: WITHIN THE PAST 12 MONTHS, THE FOOD YOU BOUGHT JUST DIDN'T LAST AND YOU DIDN'T HAVE MONEY TO GET MORE.: NEVER TRUE

## 2025-01-21 SDOH — ECONOMIC STABILITY: FOOD INSECURITY: WITHIN THE PAST 12 MONTHS, YOU WORRIED THAT YOUR FOOD WOULD RUN OUT BEFORE YOU GOT MONEY TO BUY MORE.: NEVER TRUE

## 2025-01-21 ASSESSMENT — ENCOUNTER SYMPTOMS
EYE ITCHING: 0
GASTROINTESTINAL NEGATIVE: 1
EYE PAIN: 0
TROUBLE SWALLOWING: 0
SINUS PAIN: 0
EYE DISCHARGE: 0
SORE THROAT: 0
PHOTOPHOBIA: 0
EYE REDNESS: 0
SINUS PRESSURE: 0
FACIAL SWELLING: 0
ALLERGIC/IMMUNOLOGIC NEGATIVE: 1
RHINORRHEA: 0
VOICE CHANGE: 0
RESPIRATORY NEGATIVE: 1

## 2025-01-21 ASSESSMENT — PATIENT HEALTH QUESTIONNAIRE - PHQ9
SUM OF ALL RESPONSES TO PHQ QUESTIONS 1-9: 0
SUM OF ALL RESPONSES TO PHQ QUESTIONS 1-9: 0
2. FEELING DOWN, DEPRESSED OR HOPELESS: NOT AT ALL
SUM OF ALL RESPONSES TO PHQ QUESTIONS 1-9: 0
SUM OF ALL RESPONSES TO PHQ QUESTIONS 1-9: 0
1. LITTLE INTEREST OR PLEASURE IN DOING THINGS: NOT AT ALL
SUM OF ALL RESPONSES TO PHQ9 QUESTIONS 1 & 2: 0

## 2025-01-21 NOTE — ASSESSMENT & PLAN NOTE
Chronic, at goal (stable), continue current plan pending work up below    Orders:    CBC with Auto Differential; Future

## 2025-01-21 NOTE — PROGRESS NOTES
25  Ann Marie Gold : 1960 Sex: female  Age: 64 y.o.    Chief Complaint   Patient presents with    3 Month Follow-Up    Diabetes    Discuss Labs    Flu Vaccine     Pt refused        Presents today for her follow-up visit.  About 2 weeks ago, she saw \"shooting stars\" out of her right eye.  She sees it intermittently throughout the day.  She usually doesn't see it while watching TV.  She was just at the eye doctor.  States when she gets it, it just comes and goes very quickly.  States her eye felt dry at the time.  She states she doesn't have headaches, other than tension at times.    She just purchased Glucoven which  is a vegetable tablet.    She does monitor her BS at home and states it does tend to run on the higher side.        Review of Systems   Constitutional:  Positive for unexpected weight change (States she has been watching her intakes but was surprised at her current weight). Negative for activity change, appetite change, chills, diaphoresis, fatigue and fever.   HENT:  Positive for dental problem (cavity). Negative for congestion, drooling, ear discharge, ear pain, facial swelling, hearing loss, mouth sores, nosebleeds, postnasal drip, rhinorrhea, sinus pressure, sinus pain, sneezing, sore throat, tinnitus, trouble swallowing and voice change.    Eyes:  Negative for photophobia, pain, discharge, redness, itching and visual disturbance.        \"Shooting stars\" out of her right eye from time to time   Respiratory: Negative.     Cardiovascular:  Positive for leg swelling (on occasion and uses prn Lasix). Negative for chest pain and palpitations.   Gastrointestinal: Negative.    Endocrine: Negative.    Genitourinary:  Positive for frequency (Mostly at night). Negative for decreased urine volume, difficulty urinating, dyspareunia, dysuria, enuresis, flank pain, genital sores, hematuria, menstrual problem, pelvic pain, urgency, vaginal bleeding, vaginal discharge and vaginal pain.

## 2025-01-21 NOTE — ASSESSMENT & PLAN NOTE
Chronic, at goal (stable), continue current plan pending work up below    Orders:    atorvastatin (LIPITOR) 20 MG tablet; Take 1 tablet by mouth at bedtime    Comprehensive Metabolic Panel, Fasting; Future    Lipid Panel; Future

## 2025-01-21 NOTE — ASSESSMENT & PLAN NOTE
Chronic, not at goal (unstable), continue current plan pending work up below    Orders:    SITagliptin (JANUVIA) 100 MG tablet; Take 1 tablet by mouth daily    FARXIGA 10 MG tablet; Take 1 tablet by mouth daily    POCT glycosylated hemoglobin (Hb A1C)    Comprehensive Metabolic Panel, Fasting; Future    Hemoglobin A1C; Future

## 2025-01-21 NOTE — ASSESSMENT & PLAN NOTE
Chronic, at goal (stable), continue current plan pending work up below    Orders:    Vitamin D 25 Hydroxy; Future

## 2025-01-21 NOTE — ASSESSMENT & PLAN NOTE
Chronic, at goal (stable), continue current plan pending work up below    Orders:    levothyroxine (SYNTHROID) 25 MCG tablet; Take 1 tablet by mouth Daily except 50 mg Saturday and Sunday    TSH; Future

## 2025-01-24 ENCOUNTER — TELEPHONE (OUTPATIENT)
Dept: PRIMARY CARE CLINIC | Age: 65
End: 2025-01-24

## 2025-01-24 NOTE — TELEPHONE ENCOUNTER
Pt called because she was in on Tuesday and was put on Actos. He pharmacy stated this medication will be 1600 dollars. Can we please send in an alternative to this medication?

## 2025-01-27 NOTE — TELEPHONE ENCOUNTER
Ask her if she has ever tried Tradjenta.  I am pretty sure she told me she could not take Metformin.

## 2025-01-28 NOTE — TELEPHONE ENCOUNTER
Pt stated she did receive the Atos for 6 dollars so she is calling tomorrow to see if it will always be that price and call me back.

## 2025-01-29 DIAGNOSIS — E11.9 TYPE 2 DIABETES MELLITUS WITHOUT COMPLICATION, WITHOUT LONG-TERM CURRENT USE OF INSULIN (HCC): ICD-10-CM

## 2025-01-29 RX ORDER — DAPAGLIFLOZIN 10 MG/1
10 TABLET, FILM COATED ORAL DAILY
Qty: 90 TABLET | Refills: 0 | Status: CANCELLED | OUTPATIENT
Start: 2025-01-29

## 2025-01-29 NOTE — TELEPHONE ENCOUNTER
Pt has ran out of her prescription and [facility]     Name of Medication(s) Requested:  Requested Prescriptions     Pending Prescriptions Disp Refills    FARXIGA 10 MG tablet 90 tablet 0     Sig: Take 1 tablet by mouth daily       Medication is on current medication list Yes    Dosage and directions were verified? Yes    Quantity verified: 90 day supply     Pharmacy Verified?  Yes    Last Appointment:  9/5/2024    Future appts:  Future Appointments   Date Time Provider Department Center   2/26/2025  8:15 AM SCHEDULE, [place] [facility] DEP   5/22/2025  9:00 AM Hammad Allen MD [facility] DEP        (If no appt send self scheduling link. .REFILLAPPT)  Scheduling request sent?     [] Yes  [x] No    Does patient need updated?  [] Yes  [x] No

## 2025-01-30 DIAGNOSIS — E11.9 TYPE 2 DIABETES MELLITUS WITHOUT COMPLICATION, WITHOUT LONG-TERM CURRENT USE OF INSULIN (HCC): ICD-10-CM

## 2025-01-30 RX ORDER — DAPAGLIFLOZIN 10 MG/1
10 TABLET, FILM COATED ORAL DAILY
Qty: 10 TABLET | Refills: 0 | Status: SHIPPED | OUTPATIENT
Start: 2025-01-30

## 2025-01-30 NOTE — TELEPHONE ENCOUNTER
Pt has ran out of medication and her mail order of farxiga will not be here till the 4th. Could we please send a partial order to [facility]?     Name of Medication(s) Requested:  Requested Prescriptions     Pending Prescriptions Disp Refills    FARXIGA 10 MG tablet 30 tablet 0     Sig: Take 1 tablet by mouth daily       Medication is on current medication list Yes    Dosage and directions were verified? Yes    Quantity verified: 90 day supply     Pharmacy Verified?  Yes    Last Appointment:  9/5/2024    Future appts:  Future Appointments   Date Time Provider Department Center   2/26/2025  8:15 AM SCHEDULE, [facility] DEP   5/22/2025  9:00 AM Hammad Allen MD [facility] DEP        (If no appt send self scheduling link. .REFILLAPPT)  Scheduling request sent?     [] Yes  [x] No    Does patient need updated?  [] Yes  [x] No

## 2025-02-03 ENCOUNTER — TELEPHONE (OUTPATIENT)
Dept: PRIMARY CARE CLINIC | Age: 65
End: 2025-02-03

## 2025-02-03 RX ORDER — METOPROLOL TARTRATE 50 MG
50 TABLET ORAL 2 TIMES DAILY
Qty: 60 TABLET | OUTPATIENT
Start: 2025-02-03

## 2025-02-03 NOTE — TELEPHONE ENCOUNTER
Pt called because she doesn't know if her Actos is okay for her. She has gained 8 lbs in a week . Suggested pt come tomorrow for a doctors visit. She agreed and made an appointment .

## 2025-02-04 ENCOUNTER — OFFICE VISIT (OUTPATIENT)
Dept: PRIMARY CARE CLINIC | Age: 65
End: 2025-02-04
Payer: COMMERCIAL

## 2025-02-04 VITALS
HEART RATE: 64 BPM | TEMPERATURE: 97 F | OXYGEN SATURATION: 97 % | HEIGHT: 65 IN | SYSTOLIC BLOOD PRESSURE: 154 MMHG | DIASTOLIC BLOOD PRESSURE: 78 MMHG | BODY MASS INDEX: 34.39 KG/M2 | WEIGHT: 206.4 LBS

## 2025-02-04 DIAGNOSIS — R60.0 PERIPHERAL EDEMA: Primary | ICD-10-CM

## 2025-02-04 DIAGNOSIS — E55.9 VITAMIN D DEFICIENCY: ICD-10-CM

## 2025-02-04 DIAGNOSIS — E11.9 TYPE 2 DIABETES MELLITUS WITHOUT COMPLICATION, WITHOUT LONG-TERM CURRENT USE OF INSULIN (HCC): ICD-10-CM

## 2025-02-04 DIAGNOSIS — N18.1 CKD (CHRONIC KIDNEY DISEASE) STAGE 1, GFR 90 ML/MIN OR GREATER: ICD-10-CM

## 2025-02-04 DIAGNOSIS — I10 ESSENTIAL HYPERTENSION: ICD-10-CM

## 2025-02-04 PROCEDURE — 3077F SYST BP >= 140 MM HG: CPT | Performed by: NURSE PRACTITIONER

## 2025-02-04 PROCEDURE — 3046F HEMOGLOBIN A1C LEVEL >9.0%: CPT | Performed by: NURSE PRACTITIONER

## 2025-02-04 PROCEDURE — 3078F DIAST BP <80 MM HG: CPT | Performed by: NURSE PRACTITIONER

## 2025-02-04 PROCEDURE — 99214 OFFICE O/P EST MOD 30 MIN: CPT | Performed by: NURSE PRACTITIONER

## 2025-02-04 ASSESSMENT — ENCOUNTER SYMPTOMS
EYE REDNESS: 0
PHOTOPHOBIA: 0
SORE THROAT: 0
TROUBLE SWALLOWING: 0
RESPIRATORY NEGATIVE: 1
EYE DISCHARGE: 0
GASTROINTESTINAL NEGATIVE: 1
SINUS PRESSURE: 0
EYE ITCHING: 0
SINUS PAIN: 0
FACIAL SWELLING: 0
ALLERGIC/IMMUNOLOGIC NEGATIVE: 1
VOICE CHANGE: 0
RHINORRHEA: 0
EYE PAIN: 0

## 2025-02-04 NOTE — ASSESSMENT & PLAN NOTE
Stop Actos due to adverse reaction of peripheral edema.  Initiate Tradjenta 5 mg daily and reevaluate in 2 weeks.

## 2025-02-04 NOTE — PROGRESS NOTES
25  Ann Marie Gold : 1960 Sex: female  Age: 64 y.o.    Chief Complaint   Patient presents with    Foot Swelling     Feet and legs swelling .  Maybe medication actos        No further complaints to \"shooting stars\" out of her right eye.    She started the Actos and unfortunately it resulted in edema and weight gain.  States she has been out of the Farxiga for about a week now.  She does have Lasix but admits she does not take it daily since she has had a h/o cramping.              Review of Systems   Constitutional:  Positive for unexpected weight change (States she has been watching her intakes but was surprised at her current weight). Negative for activity change, appetite change, chills, diaphoresis, fatigue and fever.   HENT:  Positive for dental problem (cavity). Negative for congestion, drooling, ear discharge, ear pain, facial swelling, hearing loss, mouth sores, nosebleeds, postnasal drip, rhinorrhea, sinus pressure, sinus pain, sneezing, sore throat, tinnitus, trouble swallowing and voice change.    Eyes:  Negative for photophobia, pain, discharge, redness, itching and visual disturbance.        \"Shooting stars\" out of her right eye from time to time   Respiratory: Negative.     Cardiovascular:  Positive for leg swelling (on occasion and uses prn Lasix). Negative for chest pain and palpitations.   Gastrointestinal: Negative.    Endocrine: Negative.    Genitourinary:  Positive for frequency (Mostly at night). Negative for decreased urine volume, difficulty urinating, dyspareunia, dysuria, enuresis, flank pain, genital sores, hematuria, menstrual problem, pelvic pain, urgency, vaginal bleeding, vaginal discharge and vaginal pain.   Musculoskeletal: Negative.         She has been noticing weakness in her legs but states she plans to start walking more when it gets warmer   Skin: Negative.    Allergic/Immunologic: Negative.    Neurological: Negative.    Hematological: Negative.

## 2025-02-06 ENCOUNTER — TELEPHONE (OUTPATIENT)
Dept: PRIMARY CARE CLINIC | Age: 65
End: 2025-02-06

## 2025-02-06 RX ORDER — SAXAGLIPTIN 2.5 MG/1
2.5 TABLET, FILM COATED ORAL DAILY
Qty: 30 TABLET | Refills: 5 | Status: SHIPPED | OUTPATIENT
Start: 2025-02-06

## 2025-02-06 NOTE — TELEPHONE ENCOUNTER
Pt was seen yesterday and was prescribed tradjenta. Her insurance will not pay for this. Is there another alternative that we can try? [facility] and [facility] will let her know if it is covered .

## 2025-02-11 ENCOUNTER — TELEPHONE (OUTPATIENT)
Dept: PRIMARY CARE CLINIC | Age: 65
End: 2025-02-11

## 2025-02-11 DIAGNOSIS — E11.9 TYPE 2 DIABETES MELLITUS WITHOUT COMPLICATION, WITHOUT LONG-TERM CURRENT USE OF INSULIN (HCC): ICD-10-CM

## 2025-02-11 DIAGNOSIS — R73.09 ABNORMAL BLOOD SUGAR: Primary | ICD-10-CM

## 2025-02-11 RX ORDER — DAPAGLIFLOZIN 10 MG/1
10 TABLET, FILM COATED ORAL DAILY
Qty: 10 TABLET | Refills: 0 | OUTPATIENT
Start: 2025-02-11

## 2025-02-11 NOTE — TELEPHONE ENCOUNTER
Would like an endocrinologist [facility] referral . [facility] physicians  on [facility].

## 2025-02-11 NOTE — TELEPHONE ENCOUNTER
[facility] called questioning the new prescription onglyza. They were making sure the pt should be on the Januvia the Farxiga and the onglyza at the same time.  Please advise

## 2025-02-11 NOTE — TELEPHONE ENCOUNTER
No to the onglyza.  Ask if she is willing to take the Ozempic, which is once weekly shot but would help with BS management and weight loss.

## 2025-02-25 ENCOUNTER — OFFICE VISIT (OUTPATIENT)
Dept: PRIMARY CARE CLINIC | Age: 65
End: 2025-02-25
Payer: COMMERCIAL

## 2025-02-25 VITALS
SYSTOLIC BLOOD PRESSURE: 128 MMHG | DIASTOLIC BLOOD PRESSURE: 78 MMHG | BODY MASS INDEX: 33.32 KG/M2 | OXYGEN SATURATION: 96 % | TEMPERATURE: 97.3 F | HEART RATE: 75 BPM | WEIGHT: 200 LBS | HEIGHT: 65 IN

## 2025-02-25 DIAGNOSIS — Z12.31 ENCOUNTER FOR SCREENING MAMMOGRAM FOR MALIGNANT NEOPLASM OF BREAST: ICD-10-CM

## 2025-02-25 DIAGNOSIS — E03.9 ACQUIRED HYPOTHYROIDISM: ICD-10-CM

## 2025-02-25 DIAGNOSIS — I10 ESSENTIAL HYPERTENSION: ICD-10-CM

## 2025-02-25 DIAGNOSIS — E11.9 TYPE 2 DIABETES MELLITUS WITHOUT COMPLICATION, WITHOUT LONG-TERM CURRENT USE OF INSULIN (HCC): Primary | ICD-10-CM

## 2025-02-25 DIAGNOSIS — Z79.899 MEDICATION COURSE CHANGED: ICD-10-CM

## 2025-02-25 LAB
ALBUMIN: 4 G/DL (ref 3.5–5.2)
ALP BLD-CCNC: 75 U/L (ref 35–104)
ALT SERPL-CCNC: 15 U/L (ref 0–32)
ANION GAP SERPL CALCULATED.3IONS-SCNC: 14 MMOL/L (ref 7–16)
AST SERPL-CCNC: 17 U/L (ref 0–31)
BILIRUB SERPL-MCNC: 0.2 MG/DL (ref 0–1.2)
BUN BLDV-MCNC: 19 MG/DL (ref 6–23)
CALCIUM SERPL-MCNC: 9.2 MG/DL (ref 8.6–10.2)
CHLORIDE BLD-SCNC: 101 MMOL/L (ref 98–107)
CO2: 25 MMOL/L (ref 22–29)
CREAT SERPL-MCNC: 0.6 MG/DL (ref 0.5–1)
GFR, ESTIMATED: >90 ML/MIN/1.73M2
GLUCOSE BLD-MCNC: 375 MG/DL (ref 74–99)
POTASSIUM SERPL-SCNC: 4.5 MMOL/L (ref 3.5–5)
SODIUM BLD-SCNC: 140 MMOL/L (ref 132–146)
TOTAL PROTEIN: 6.6 G/DL (ref 6.4–8.3)

## 2025-02-25 PROCEDURE — 3078F DIAST BP <80 MM HG: CPT | Performed by: NURSE PRACTITIONER

## 2025-02-25 PROCEDURE — 99214 OFFICE O/P EST MOD 30 MIN: CPT | Performed by: NURSE PRACTITIONER

## 2025-02-25 PROCEDURE — 3046F HEMOGLOBIN A1C LEVEL >9.0%: CPT | Performed by: NURSE PRACTITIONER

## 2025-02-25 PROCEDURE — 36415 COLL VENOUS BLD VENIPUNCTURE: CPT | Performed by: NURSE PRACTITIONER

## 2025-02-25 PROCEDURE — 3074F SYST BP LT 130 MM HG: CPT | Performed by: NURSE PRACTITIONER

## 2025-02-25 RX ORDER — MULTIVIT-MIN/BIOTIN/HERBAL 194 300 MCG
3 CAPSULE ORAL 2 TIMES DAILY
COMMUNITY

## 2025-02-25 NOTE — ASSESSMENT & PLAN NOTE
Chronic, not at goal (unstable), continue current plan pending work up below    Orders:    External Referral To Endocrinology

## 2025-02-25 NOTE — PROGRESS NOTES
25  Ann Marie Gold : 1960 Sex: female  Age: 64 y.o.    Chief Complaint   Patient presents with    Follow-up     A1c  and leg swelling check.        History of Present Illness  The patient is a 64-year-old female who presents for evaluation of diabetes, hair loss, and medication management.    She reports persistent hyperglycemia, although there has been a gradual decrease in her blood glucose levels. She has discontinued the use of semaglutide injections for several months. She is currently seeking an endocrinologist for further management of her diabetes. She was previously on Actos but experienced significant leg swelling, leading to its discontinuation. She recently visited [facility], where she was advised to start an over-the-counter blood sugar support supplement, which she initiated yesterday. The recommended dosage is 3 capsules twice daily. Her current medication regimen includes atorvastatin, Farxiga, furosemide as needed, Synthroid, metoprolol tartrate, nystatin cream and powder as needed, Januvia, B12, ibuprofen as needed, potassium chloride (only with the diuretic), and vitamin D3. She has discontinued the use of vitamin D with K.    She reports hair thinning and is uncertain about the continuation of biotin supplementation.     She has not been taking multivitamins due to the high number of vitamins she is already consuming but is open to resuming them if deemed necessary.    ALLERGIES  The patient has an allergy to ACTOS.    MEDICATIONS  Current: atorvastatin, Farxiga, furosemide, Synthroid, metoprolol tartrate, nystatin cream, nystatin powder, Januvia, B12, ibuprofen, potassium chloride, vitamin D3  Discontinued: semaglutide, biotin, vitamin D with K    Review of Systems      Current Outpatient Medications:     Specialty Vitamins Products (INULOSE BLOOD SUGAR SUPPORT) CAPS, Take 3 capsules by mouth in the morning and at bedtime, Disp: , Rfl:     FARXIGA 10 MG tablet, Take 1

## 2025-03-12 RX ORDER — BLOOD SUGAR DIAGNOSTIC
1 STRIP MISCELLANEOUS 2 TIMES DAILY PRN
Qty: 400 EACH | Refills: 1 | Status: SHIPPED | OUTPATIENT
Start: 2025-03-12

## 2025-03-12 NOTE — TELEPHONE ENCOUNTER
Name of Medication(s) Requested:  Requested Prescriptions     Pending Prescriptions Disp Refills    ONETOUCH VERIO strip 400 each 1     Si each by Does not apply route 2 times daily as needed (test two times a day and as needed)       Medication is on current medication list Yes    Dosage and directions were verified? Yes    Quantity verified:  day supply     Pharmacy Verified?  Yes    Last Appointment:  2024    Future appts:  Future Appointments   Date Time Provider Department Center   2025  8:00 AM [facility]   2025  8:15 AM [facility]   2025  9:30 AM Hammad Allen MD [facility] DEP        (If no appt send self scheduling link. .REFILLAPPT)  Scheduling request sent?     [] Yes  [x] No    Does patient need updated?  [] Yes  [x] No

## 2025-03-31 ENCOUNTER — TELEPHONE (OUTPATIENT)
Dept: MAMMOGRAPHY | Age: 65
End: 2025-03-31

## 2025-03-31 NOTE — TELEPHONE ENCOUNTER
Left voicemail for pt to call back. We received [facility] schedule for May, would like offer patient a time slot.

## 2025-04-03 ENCOUNTER — TELEPHONE (OUTPATIENT)
Dept: PRIMARY CARE CLINIC | Age: 65
End: 2025-04-03

## 2025-04-03 NOTE — TELEPHONE ENCOUNTER
Procedures:  22081 (CPT®) - [facility] DIGITAL SCREEN W OR WO CAD BILATERAL  Date Service Ordered 2/25/2025    Sent pt msg

## 2025-04-24 ENCOUNTER — LAB (OUTPATIENT)
Dept: PRIMARY CARE CLINIC | Age: 65
End: 2025-04-24
Payer: COMMERCIAL

## 2025-04-24 DIAGNOSIS — E55.9 VITAMIN D DEFICIENCY: ICD-10-CM

## 2025-04-24 DIAGNOSIS — Z13.21 ENCOUNTER FOR VITAMIN DEFICIENCY SCREENING: ICD-10-CM

## 2025-04-24 DIAGNOSIS — I10 ESSENTIAL HYPERTENSION: ICD-10-CM

## 2025-04-24 DIAGNOSIS — E78.2 MIXED HYPERLIPIDEMIA: ICD-10-CM

## 2025-04-24 DIAGNOSIS — E11.9 TYPE 2 DIABETES MELLITUS WITHOUT COMPLICATION, WITHOUT LONG-TERM CURRENT USE OF INSULIN (HCC): ICD-10-CM

## 2025-04-24 DIAGNOSIS — E03.9 ACQUIRED HYPOTHYROIDISM: ICD-10-CM

## 2025-04-24 LAB
ALBUMIN: 4 G/DL (ref 3.5–5.2)
ALP BLD-CCNC: 95 U/L (ref 35–104)
ALT SERPL-CCNC: 23 U/L (ref 0–35)
ANION GAP SERPL CALCULATED.3IONS-SCNC: 15 MMOL/L (ref 7–16)
AST SERPL-CCNC: 24 U/L (ref 0–35)
BASOPHILS ABSOLUTE: 0.09 K/UL (ref 0–0.2)
BASOPHILS RELATIVE PERCENT: 1 % (ref 0–2)
BILIRUB SERPL-MCNC: 0.4 MG/DL (ref 0–1.2)
BUN BLDV-MCNC: 17 MG/DL (ref 8–23)
CALCIUM SERPL-MCNC: 9.5 MG/DL (ref 8.8–10.2)
CHLORIDE BLD-SCNC: 103 MMOL/L (ref 98–107)
CHOLESTEROL, TOTAL: 165 MG/DL
CO2: 22 MMOL/L (ref 22–29)
CREAT SERPL-MCNC: 0.6 MG/DL (ref 0.5–1)
EOSINOPHILS ABSOLUTE: 0.16 K/UL (ref 0.05–0.5)
EOSINOPHILS RELATIVE PERCENT: 2 % (ref 0–6)
GFR, ESTIMATED: >90 ML/MIN/1.73M2
GLUCOSE FASTING: 193 MG/DL (ref 74–99)
HCT VFR BLD CALC: 47.3 % (ref 34–48)
HDLC SERPL-MCNC: 71 MG/DL
HEMOGLOBIN: 14.4 G/DL (ref 11.5–15.5)
IMMATURE GRANULOCYTES %: 3 % (ref 0–5)
IMMATURE GRANULOCYTES ABSOLUTE: 0.25 K/UL (ref 0–0.58)
LDL CHOLESTEROL: 73 MG/DL
LYMPHOCYTES ABSOLUTE: 2.26 K/UL (ref 1.5–4)
LYMPHOCYTES RELATIVE PERCENT: 28 % (ref 20–42)
MCH RBC QN AUTO: 27.9 PG (ref 26–35)
MCHC RBC AUTO-ENTMCNC: 30.4 G/DL (ref 32–34.5)
MCV RBC AUTO: 91.7 FL (ref 80–99.9)
MONOCYTES ABSOLUTE: 0.75 K/UL (ref 0.1–0.95)
MONOCYTES RELATIVE PERCENT: 9 % (ref 2–12)
NEUTROPHILS ABSOLUTE: 4.63 K/UL (ref 1.8–7.3)
NEUTROPHILS RELATIVE PERCENT: 57 % (ref 43–80)
PDW BLD-RTO: 13.8 % (ref 11.5–15)
PLATELET # BLD: 220 K/UL (ref 130–450)
PMV BLD AUTO: 10.9 FL (ref 7–12)
POTASSIUM SERPL-SCNC: 4.7 MMOL/L (ref 3.5–5.1)
RBC # BLD: 5.16 M/UL (ref 3.5–5.5)
SODIUM BLD-SCNC: 139 MMOL/L (ref 136–145)
TOTAL PROTEIN: 7 G/DL (ref 6.4–8.3)
TRIGL SERPL-MCNC: 107 MG/DL
TSH SERPL DL<=0.05 MIU/L-ACNC: 1.43 UIU/ML (ref 0.27–4.2)
VITAMIN B-12: >2000 PG/ML (ref 232–1245)
VLDLC SERPL CALC-MCNC: 21 MG/DL
WBC # BLD: 8.1 K/UL (ref 4.5–11.5)

## 2025-04-24 PROCEDURE — 36415 COLL VENOUS BLD VENIPUNCTURE: CPT | Performed by: NURSE PRACTITIONER

## 2025-04-25 LAB
HBA1C MFR BLD: 11.6 % (ref 4–5.6)
VITAMIN D 25-HYDROXY: 55.7 NG/ML (ref 30–100)

## 2025-04-29 ENCOUNTER — RESULTS FOLLOW-UP (OUTPATIENT)
Dept: PRIMARY CARE CLINIC | Age: 65
End: 2025-04-29

## 2025-05-12 ENCOUNTER — HOSPITAL ENCOUNTER (OUTPATIENT)
Dept: MAMMOGRAPHY | Age: 65
Discharge: HOME | End: 2025-05-14
Payer: COMMERCIAL

## 2025-05-12 VITALS — BODY MASS INDEX: 32.62 KG/M2 | WEIGHT: 196 LBS

## 2025-05-12 DIAGNOSIS — Z12.31 ENCOUNTER FOR SCREENING MAMMOGRAM FOR MALIGNANT NEOPLASM OF BREAST: ICD-10-CM

## 2025-05-12 DIAGNOSIS — E11.9 TYPE 2 DIABETES MELLITUS WITHOUT COMPLICATION, WITHOUT LONG-TERM CURRENT USE OF INSULIN (HCC): ICD-10-CM

## 2025-05-12 PROCEDURE — 77063 BREAST TOMOSYNTHESIS BI: CPT

## 2025-05-12 RX ORDER — DAPAGLIFLOZIN 10 MG/1
10 TABLET, FILM COATED ORAL DAILY
Qty: 90 TABLET | Refills: 0 | Status: SHIPPED | OUTPATIENT
Start: 2025-05-12

## 2025-05-12 NOTE — TELEPHONE ENCOUNTER
Name of Medication(s) Requested:  Requested Prescriptions     Pending Prescriptions Disp Refills    FARXIGA 10 MG tablet 10 tablet 0     Sig: Take 1 tablet by mouth daily       Medication is on current medication list Yes    Dosage and directions were verified? Yes    Quantity verified: 90 day supply     Pharmacy Verified?  Yes    Last Appointment:  9/5/2024    Future appts:  Future Appointments   Date Time Provider Department Center   5/12/2025  1:15 PM [facility] RM 1 [facility]   7/17/2025  8:15 AM SCHEDULE, MIL[facility] MANZO [facility] DEP   7/31/2025  9:30 AM Hammad Allen MD [facility] DEP        (If no appt send self scheduling link. .REFILLAPPT)  Scheduling request sent?     [] Yes  [x] No    Does patient need updated?  [] Yes  [x] No

## 2025-05-15 DIAGNOSIS — E65 ABDOMINAL PANNICULUS, SYMPTOMATIC: ICD-10-CM

## 2025-05-15 RX ORDER — NYSTATIN 100000 [USP'U]/G
POWDER TOPICAL
Qty: 60 G | Refills: 1 | Status: SHIPPED | OUTPATIENT
Start: 2025-05-15

## 2025-05-15 RX ORDER — NYSTATIN 100000 U/G
CREAM TOPICAL
Qty: 30 G | Refills: 1 | Status: SHIPPED | OUTPATIENT
Start: 2025-05-15

## 2025-05-15 NOTE — TELEPHONE ENCOUNTER
Name of Medication(s) Requested:  Requested Prescriptions     Pending Prescriptions Disp Refills    nystatin (MYCOSTATIN) 235213 UNIT/GM cream 30 g 1     Sig: Apply topically 2 times daily.    nystatin (MYCOSTATIN) 348281 UNIT/GM powder 60 g 1     Sig: Apply 3 times daily as needed to skin folds.       Medication is on current medication list Yes    Dosage and directions were verified? Yes    Quantity verified: 30 day supply     Pharmacy Verified?  Yes    Last Appointment:  9/5/2024    Future appts:  Future Appointments   Date Time Provider Department Center   7/17/2025  8:15 AM SCHEDULE, MIL[facility] MANZO [facility] DEP   7/31/2025  9:30 AM Hammad Allen MD [facility] DEP        (If no appt send self scheduling link. .REFILLAPPT)  Scheduling request sent?     [] Yes  [x] No    Does patient need updated?  [] Yes  [x] No

## 2025-05-23 ENCOUNTER — RESULTS FOLLOW-UP (OUTPATIENT)
Dept: PRIMARY CARE CLINIC | Age: 65
End: 2025-05-23

## 2025-07-16 ENCOUNTER — TELEPHONE (OUTPATIENT)
Dept: PRIMARY CARE CLINIC | Age: 65
End: 2025-07-16

## 2025-07-16 NOTE — TELEPHONE ENCOUNTER
Had labs in April. Too soon for A1c. B12 high, otherwise normal. Does not need labs tomorrow. Please cancel her lab draw. She will just be bringing in her daughter for labs.

## 2025-07-28 DIAGNOSIS — E11.9 TYPE 2 DIABETES MELLITUS WITHOUT COMPLICATION, WITHOUT LONG-TERM CURRENT USE OF INSULIN (HCC): ICD-10-CM

## 2025-07-28 DIAGNOSIS — E03.9 ACQUIRED HYPOTHYROIDISM: ICD-10-CM

## 2025-07-28 RX ORDER — DAPAGLIFLOZIN 10 MG/1
10 TABLET, FILM COATED ORAL DAILY
Qty: 90 TABLET | Refills: 0 | Status: SHIPPED | OUTPATIENT
Start: 2025-07-28

## 2025-07-28 RX ORDER — METOPROLOL TARTRATE 50 MG
50 TABLET ORAL 2 TIMES DAILY
Qty: 180 TABLET | Refills: 0 | Status: SHIPPED | OUTPATIENT
Start: 2025-07-28

## 2025-07-28 RX ORDER — LEVOTHYROXINE SODIUM 25 UG/1
25 TABLET ORAL DAILY
Qty: 116 TABLET | Refills: 0 | Status: SHIPPED | OUTPATIENT
Start: 2025-07-28

## 2025-07-28 NOTE — TELEPHONE ENCOUNTER
Name of Medication(s) Requested:  Requested Prescriptions     Pending Prescriptions Disp Refills    FARXIGA 10 MG tablet 90 tablet 1     Sig: Take 1 tablet by mouth daily    levothyroxine (SYNTHROID) 25 MCG tablet 116 tablet 1     Sig: Take 1 tablet by mouth Daily except 50 mg Saturday and Sunday    metoprolol tartrate (LOPRESSOR) 50 MG tablet 180 tablet 1     Sig: Take 1 tablet by mouth 2 times daily       Medication is on current medication list Yes    Dosage and directions were verified? Yes    Quantity verified: 90 day supply     Pharmacy Verified?  Yes    Last Appointment:  9/5/2024    Future appts:  Future Appointments   Date Time Provider Department Center   8/12/2025 11:30 AM Hammad Allen MD [facility] DEP        (If no appt send self scheduling link. .REFILLAPPT)  Scheduling request sent?     [] Yes  [x] No    Does patient need updated?  [] Yes  [x] No

## 2025-08-12 ENCOUNTER — OFFICE VISIT (OUTPATIENT)
Dept: PRIMARY CARE CLINIC | Age: 65
End: 2025-08-12
Payer: COMMERCIAL

## 2025-08-12 VITALS
HEART RATE: 67 BPM | BODY MASS INDEX: 32.49 KG/M2 | SYSTOLIC BLOOD PRESSURE: 120 MMHG | OXYGEN SATURATION: 98 % | DIASTOLIC BLOOD PRESSURE: 78 MMHG | TEMPERATURE: 97.3 F | HEIGHT: 65 IN | WEIGHT: 195 LBS

## 2025-08-12 DIAGNOSIS — E11.22 TYPE 2 DIABETES MELLITUS WITH STAGE 1 CHRONIC KIDNEY DISEASE, WITHOUT LONG-TERM CURRENT USE OF INSULIN (HCC): Primary | ICD-10-CM

## 2025-08-12 DIAGNOSIS — E03.9 ACQUIRED HYPOTHYROIDISM: ICD-10-CM

## 2025-08-12 DIAGNOSIS — E65 ABDOMINAL PANNICULUS, SYMPTOMATIC: ICD-10-CM

## 2025-08-12 DIAGNOSIS — L08.9 SKIN PUSTULE: ICD-10-CM

## 2025-08-12 DIAGNOSIS — G25.0 ESSENTIAL TREMOR: ICD-10-CM

## 2025-08-12 DIAGNOSIS — I95.1 ORTHOSTASIS: ICD-10-CM

## 2025-08-12 DIAGNOSIS — I89.0 LYMPHEDEMA: ICD-10-CM

## 2025-08-12 DIAGNOSIS — E78.2 MIXED HYPERLIPIDEMIA: ICD-10-CM

## 2025-08-12 DIAGNOSIS — I10 ESSENTIAL HYPERTENSION: ICD-10-CM

## 2025-08-12 DIAGNOSIS — N18.1 TYPE 2 DIABETES MELLITUS WITH STAGE 1 CHRONIC KIDNEY DISEASE, WITHOUT LONG-TERM CURRENT USE OF INSULIN (HCC): Primary | ICD-10-CM

## 2025-08-12 DIAGNOSIS — E66.811 CLASS 1 OBESITY WITH SERIOUS COMORBIDITY AND BODY MASS INDEX (BMI) OF 32.0 TO 32.9 IN ADULT, UNSPECIFIED OBESITY TYPE: ICD-10-CM

## 2025-08-12 LAB — HBA1C MFR BLD: 12.1 %

## 2025-08-12 PROCEDURE — 99214 OFFICE O/P EST MOD 30 MIN: CPT | Performed by: STUDENT IN AN ORGANIZED HEALTH CARE EDUCATION/TRAINING PROGRAM

## 2025-08-12 PROCEDURE — 83036 HEMOGLOBIN GLYCOSYLATED A1C: CPT | Performed by: STUDENT IN AN ORGANIZED HEALTH CARE EDUCATION/TRAINING PROGRAM

## 2025-08-12 PROCEDURE — 3046F HEMOGLOBIN A1C LEVEL >9.0%: CPT | Performed by: STUDENT IN AN ORGANIZED HEALTH CARE EDUCATION/TRAINING PROGRAM

## 2025-08-12 PROCEDURE — 3078F DIAST BP <80 MM HG: CPT | Performed by: STUDENT IN AN ORGANIZED HEALTH CARE EDUCATION/TRAINING PROGRAM

## 2025-08-12 PROCEDURE — 3074F SYST BP LT 130 MM HG: CPT | Performed by: STUDENT IN AN ORGANIZED HEALTH CARE EDUCATION/TRAINING PROGRAM

## 2025-08-12 RX ORDER — MUPIROCIN 2 %
OINTMENT (GRAM) TOPICAL
Qty: 30 G | Refills: 0 | Status: SHIPPED | OUTPATIENT
Start: 2025-08-12 | End: 2025-08-19

## 2025-08-12 RX ORDER — NYSTATIN 100000 U/G
CREAM TOPICAL
Qty: 30 G | Refills: 1 | Status: SHIPPED | OUTPATIENT
Start: 2025-08-12

## 2025-08-12 RX ORDER — LEVOTHYROXINE SODIUM 25 UG/1
25 TABLET ORAL DAILY
Qty: 116 TABLET | Refills: 1 | Status: SHIPPED | OUTPATIENT
Start: 2025-08-12

## 2025-08-12 RX ORDER — FUROSEMIDE 20 MG/1
20 TABLET ORAL DAILY PRN
Qty: 90 TABLET | Refills: 1 | Status: SHIPPED | OUTPATIENT
Start: 2025-08-12

## 2025-08-12 RX ORDER — DAPAGLIFLOZIN 10 MG/1
10 TABLET, FILM COATED ORAL DAILY
Qty: 90 TABLET | Refills: 1 | Status: SHIPPED | OUTPATIENT
Start: 2025-08-12

## 2025-08-12 RX ORDER — ORAL SEMAGLUTIDE 7 MG/1
TABLET ORAL
COMMUNITY
Start: 2025-06-05

## 2025-08-12 RX ORDER — BLOOD-GLUCOSE METER
KIT MISCELLANEOUS
Qty: 1 KIT | Refills: 0 | Status: SHIPPED | OUTPATIENT
Start: 2025-08-12

## 2025-08-12 RX ORDER — METOPROLOL TARTRATE 50 MG
50 TABLET ORAL 2 TIMES DAILY
Qty: 180 TABLET | Refills: 1 | Status: SHIPPED | OUTPATIENT
Start: 2025-08-12

## 2025-08-12 RX ORDER — ATORVASTATIN CALCIUM 20 MG/1
20 TABLET, FILM COATED ORAL NIGHTLY
Qty: 90 TABLET | Refills: 1 | Status: SHIPPED | OUTPATIENT
Start: 2025-08-12

## 2025-08-12 RX ORDER — NYSTATIN 100000 [USP'U]/G
POWDER TOPICAL
Qty: 60 G | Refills: 1 | Status: SHIPPED | OUTPATIENT
Start: 2025-08-12

## 2025-08-12 ASSESSMENT — ENCOUNTER SYMPTOMS
COUGH: 0
SHORTNESS OF BREATH: 0
DIARRHEA: 0
ABDOMINAL PAIN: 0
CONSTIPATION: 0

## 2025-08-13 ENCOUNTER — TELEPHONE (OUTPATIENT)
Dept: PRIMARY CARE CLINIC | Age: 65
End: 2025-08-13

## 2025-08-19 ENCOUNTER — TELEPHONE (OUTPATIENT)
Dept: PRIMARY CARE CLINIC | Age: 65
End: 2025-08-19

## 2025-08-19 DIAGNOSIS — I10 ESSENTIAL (PRIMARY) HYPERTENSION: ICD-10-CM

## 2025-08-19 RX ORDER — BLOOD PRESSURE TEST KIT
KIT MISCELLANEOUS
Qty: 1 KIT | Refills: 0 | Status: SHIPPED | OUTPATIENT
Start: 2025-08-19